# Patient Record
Sex: FEMALE | Race: WHITE | NOT HISPANIC OR LATINO | Employment: FULL TIME | URBAN - METROPOLITAN AREA
[De-identification: names, ages, dates, MRNs, and addresses within clinical notes are randomized per-mention and may not be internally consistent; named-entity substitution may affect disease eponyms.]

---

## 2023-10-26 PROBLEM — R60.0 LEG EDEMA: Status: ACTIVE | Noted: 2023-10-26

## 2023-10-26 PROBLEM — N32.81 OVERACTIVE BLADDER: Status: ACTIVE | Noted: 2023-10-26

## 2023-10-26 PROBLEM — I10 ESSENTIAL HYPERTENSION: Status: ACTIVE | Noted: 2023-10-26

## 2023-10-26 PROBLEM — R35.0 FREQUENCY OF MICTURITION: Status: RESOLVED | Noted: 2023-10-26 | Resolved: 2023-10-26

## 2023-10-26 PROBLEM — N81.4 UTEROVAGINAL PROLAPSE: Status: ACTIVE | Noted: 2023-10-26

## 2023-10-26 PROBLEM — N81.9 PROLAPSE OF FEMALE PELVIC ORGANS: Status: ACTIVE | Noted: 2023-10-26

## 2023-10-26 PROBLEM — R79.89 LOW VITAMIN D LEVEL: Status: ACTIVE | Noted: 2023-10-26

## 2023-10-26 PROBLEM — R07.9 CHEST PAIN: Status: ACTIVE | Noted: 2023-10-26

## 2023-10-26 PROBLEM — R06.02 SOB (SHORTNESS OF BREATH) ON EXERTION: Status: ACTIVE | Noted: 2023-10-26

## 2023-10-26 PROBLEM — R79.89 ELEVATED SERUM CREATININE: Status: ACTIVE | Noted: 2023-10-26

## 2023-10-26 PROBLEM — R35.0 FREQUENCY OF MICTURITION: Status: ACTIVE | Noted: 2023-10-26

## 2023-10-26 PROBLEM — R10.2 PELVIC PAIN IN FEMALE: Status: ACTIVE | Noted: 2023-10-26

## 2023-10-26 PROBLEM — E78.00 HYPERCHOLESTEROLEMIA: Status: ACTIVE | Noted: 2023-10-26

## 2023-10-26 PROBLEM — R94.31 ABNORMAL EKG: Status: ACTIVE | Noted: 2023-10-26

## 2023-10-26 RX ORDER — LEVOTHYROXINE SODIUM 88 UG/1
1 TABLET ORAL DAILY
COMMUNITY
Start: 2022-02-23

## 2023-10-26 RX ORDER — VIT C/E/ZN/COPPR/LUTEIN/ZEAXAN 250MG-90MG
1 CAPSULE ORAL DAILY
COMMUNITY
Start: 2022-07-21

## 2023-10-27 ENCOUNTER — OFFICE VISIT (OUTPATIENT)
Dept: CARDIOLOGY | Facility: CLINIC | Age: 53
End: 2023-10-27
Payer: MEDICAID

## 2023-10-27 VITALS
HEART RATE: 86 BPM | SYSTOLIC BLOOD PRESSURE: 120 MMHG | BODY MASS INDEX: 30.56 KG/M2 | HEIGHT: 64 IN | WEIGHT: 179 LBS | DIASTOLIC BLOOD PRESSURE: 80 MMHG | OXYGEN SATURATION: 96 %

## 2023-10-27 DIAGNOSIS — R94.31 ABNORMAL EKG: Primary | ICD-10-CM

## 2023-10-27 PROCEDURE — 3079F DIAST BP 80-89 MM HG: CPT | Performed by: PHYSICIAN ASSISTANT

## 2023-10-27 PROCEDURE — 3074F SYST BP LT 130 MM HG: CPT | Performed by: PHYSICIAN ASSISTANT

## 2023-10-27 PROCEDURE — 99214 OFFICE O/P EST MOD 30 MIN: CPT | Performed by: PHYSICIAN ASSISTANT

## 2023-10-27 RX ORDER — OXYCODONE AND ACETAMINOPHEN 5; 325 MG/1; MG/1
TABLET ORAL
COMMUNITY
Start: 2021-01-07

## 2023-10-27 NOTE — PROGRESS NOTES
"Chief Complaint:   Follow-up     History Of Present Illness:    Gena Baltazar is a 53 y.o. female presenting for routine cardiovascular follow up.  She reports persistent, however not worsening, dyspnea with exertion.  Patient does continue to smoke cigarettes.  Most recent cardiovascular testing including: an exercise nuclear stress test 3/2023 displaying normal myocardial perfusion with stress LVEF 69%, 2D echo at that time displaying LVEF 60% without valvulopathy.  Patient denies chest pain, chest pressure, palpitations, dyspnea on exertion, shortness of breath at rest, diaphoresis, nausea/vomiting, back pain, headache, lightheadedness, dizziness, syncope or presyncopal episodes, active bleeding signs or symptoms, excessive weight gain, muscle or joint pain, claudication.       Last Recorded Vitals:  Vitals:    10/27/23 1323   BP: 120/80   BP Location: Left arm   Patient Position: Sitting   BP Cuff Size: Adult   Pulse: 86   SpO2: 96%   Weight: 81.2 kg (179 lb)   Height: 1.626 m (5' 4\")       Past Medical History:  She has no past medical history on file.    Past Surgical History:  She has a past surgical history that includes Other surgical history (07/12/2022).      Social History:  She has no history on file for tobacco use, alcohol use, and drug use.    Family History:  No family history on file.     Allergies:  Penicillins    Outpatient Medications:  Current Outpatient Medications   Medication Instructions    cholecalciferol (Vitamin D-3) 25 MCG (1000 UT) capsule 1 capsule, oral, Daily    dulaglutide (TRULICITY) 1.5 mg, subcutaneous    levothyroxine (Synthroid, Levoxyl) 88 mcg tablet 1 tablet, oral, Daily    oxyCODONE-acetaminophen (Percocet) 5-325 mg tablet oral       Physical Exam:  Constitutional: awake and alert, oriented ×3, no apparent distress  Skin: warm, dry, good turgor no obvious lesions  Eyes: pupils equal, round, reactive to light, conjunctiva pink and noninjected, no discharge  HENT: " "normocephalic and atraumatic, mucous membranes moist, trachea midline with no masses/goiter  Cardiovascular: S1/S2 regular, no murmur no rubs/gallops, no carotid bruits, no JVD  Pulmonary: symmetrical chest expansion, lungs are clear to auscultation bilaterally, no wheezes/rales/rhonchi, normal effort  Abdomen: nontender, nondistended, active bowel sounds, no ascites  Extremities: no cyanosis, clubbing, no LE edema no lesions; palpable pedal pulses  Neurologic: cranial nerves II - XII grossly intact, stable gait, no tremor       Last Labs:  CBC -  Lab Results   Component Value Date    WBC 7.4 07/21/2022    HGB 16.4 (H) 07/21/2022    HCT 48.0 (H) 07/21/2022    MCV 87 07/21/2022     07/21/2022       CMP -  Lab Results   Component Value Date    CALCIUM 10.4 07/21/2022    PHOS 4.4 07/21/2022    ALBUMIN 4.4 07/21/2022       LIPID PANEL -   No results found for: \"CHOL\", \"TRIG\", \"HDL\", \"CHHDL\", \"LDLF\", \"VLDL\", \"NHDL\"    RENAL FUNCTION PANEL -   Lab Results   Component Value Date    GLUCOSE 93 07/21/2022     07/21/2022    K 4.4 07/21/2022     07/21/2022    CO2 29 07/21/2022    ANIONGAP 12 07/21/2022    BUN 16 07/21/2022    CREATININE 0.90 07/21/2022    CALCIUM 10.4 07/21/2022    PHOS 4.4 07/21/2022    ALBUMIN 4.4 07/21/2022        Lab Results   Component Value Date    HGBA1C 5.6 01/13/2022       Last Cardiology Tests:  ECG:  No results found for this or any previous visit from the past 1095 days.      Echo:  No results found for this or any previous visit from the past 1095 days.      Ejection Fractions:  No results found for: \"EF\"    Cath:  No results found for this or any previous visit from the past 1095 days.      Stress Test:  Nuclear Stress Test 3/28/2023      Cardiac Imaging:  No results found for this or any previous visit from the past 1095 days.            Assessment/Plan   Problem List Items Addressed This Visit             ICD-10-CM       Cardiac and Vasculature    Abnormal EKG - Primary " R94.31    Relevant Orders    CT cardiac scoring wo IV contrast     -CAC scoring    -Follow up in 6 months    Gabino Holt PA-C

## 2023-11-10 ENCOUNTER — HOSPITAL ENCOUNTER (OUTPATIENT)
Dept: RADIOLOGY | Facility: HOSPITAL | Age: 53
Discharge: HOME | End: 2023-11-10
Payer: MEDICAID

## 2023-11-10 DIAGNOSIS — R94.31 ABNORMAL EKG: ICD-10-CM

## 2023-11-10 PROCEDURE — 75571 CT HRT W/O DYE W/CA TEST: CPT

## 2024-04-15 PROBLEM — M54.30 SCIATICA: Status: ACTIVE | Noted: 2020-03-19

## 2024-04-15 PROBLEM — F17.210 CIGARETTE NICOTINE DEPENDENCE, UNCOMPLICATED: Status: ACTIVE | Noted: 2017-09-04

## 2024-04-15 PROBLEM — K57.30 DIVERTICULAR DISEASE OF COLON: Status: ACTIVE | Noted: 2020-01-09

## 2024-04-15 PROBLEM — I67.1 ANEURYSM OF INTERNAL CAROTID ARTERY (HHS-HCC): Status: ACTIVE | Noted: 2022-01-12

## 2024-04-15 PROBLEM — H91.90 HEARING LOSS: Status: ACTIVE | Noted: 2021-02-08

## 2024-05-17 ENCOUNTER — OFFICE VISIT (OUTPATIENT)
Dept: CARDIOLOGY | Facility: CLINIC | Age: 54
End: 2024-05-17
Payer: MEDICAID

## 2024-05-17 VITALS
SYSTOLIC BLOOD PRESSURE: 110 MMHG | WEIGHT: 153 LBS | OXYGEN SATURATION: 96 % | HEIGHT: 63 IN | HEART RATE: 81 BPM | DIASTOLIC BLOOD PRESSURE: 80 MMHG | BODY MASS INDEX: 27.11 KG/M2

## 2024-05-17 DIAGNOSIS — I10 ESSENTIAL HYPERTENSION: ICD-10-CM

## 2024-05-17 DIAGNOSIS — E78.00 HYPERCHOLESTEROLEMIA: ICD-10-CM

## 2024-05-17 DIAGNOSIS — R07.89 OTHER CHEST PAIN: Primary | ICD-10-CM

## 2024-05-17 PROCEDURE — 3074F SYST BP LT 130 MM HG: CPT | Performed by: PHYSICIAN ASSISTANT

## 2024-05-17 PROCEDURE — 3079F DIAST BP 80-89 MM HG: CPT | Performed by: PHYSICIAN ASSISTANT

## 2024-05-17 PROCEDURE — 99214 OFFICE O/P EST MOD 30 MIN: CPT | Performed by: PHYSICIAN ASSISTANT

## 2024-05-17 RX ORDER — TRIAMTERENE AND HYDROCHLOROTHIAZIDE 37.5; 25 MG/1; MG/1
1 CAPSULE ORAL DAILY
COMMUNITY
Start: 2024-05-03

## 2024-05-17 NOTE — PROGRESS NOTES
"Chief Complaint:   6mo/labs     History Of Present Illness:    5/17/24  CAC score resulted 0 Agatston units consistent with no identifiable arteriosclerosis.  Patient denies chest pain, chest pressure, palpitations, dyspnea on exertion, shortness of breath at rest, diaphoresis, nausea/vomiting, back pain, headache, lightheadedness, dizziness, syncope or presyncopal episodes, active bleeding signs or symptoms, excessive weight gain, muscle or joint pain, claudication.      10/27/23  Gena Baltazar is a 54 y.o. female presenting for routine cardiovascular follow up.  She reports persistent, however not worsening, dyspnea with exertion.  Patient does continue to smoke cigarettes.  Most recent cardiovascular testing including: an exercise nuclear stress test 3/2023 displaying normal myocardial perfusion with stress LVEF 69%, 2D echo at that time displaying LVEF 60% without valvulopathy.  Patient denies chest pain, chest pressure, palpitations, dyspnea on exertion, shortness of breath at rest, diaphoresis, nausea/vomiting, back pain, headache, lightheadedness, dizziness, syncope or presyncopal episodes, active bleeding signs or symptoms, excessive weight gain, muscle or joint pain, claudication.       Last Recorded Vitals:  Vitals:    05/17/24 1413   BP: 110/80   Pulse: 81   SpO2: 96%   Weight: 69.4 kg (153 lb)   Height: 1.6 m (5' 3\")       Past Medical History:  She has no past medical history on file.    Past Surgical History:  She has a past surgical history that includes Other surgical history (07/12/2022).      Social History:  She has no history on file for tobacco use, alcohol use, and drug use.    Family History:  No family history on file.     Allergies:  Penicillins    Outpatient Medications:  Current Outpatient Medications   Medication Instructions    cholecalciferol (Vitamin D-3) 25 MCG (1000 UT) capsule 1 capsule, oral, Daily    dulaglutide (TRULICITY) 1.5 mg, subcutaneous    levothyroxine (Synthroid, " "Levoxyl) 88 mcg tablet 1 tablet, oral, Daily    oxyCODONE-acetaminophen (Percocet) 5-325 mg tablet oral    triamterene-hydrochlorothiazid (Dyazide) 37.5-25 mg capsule 1 capsule, oral, Daily       Physical Exam:  Constitutional: awake and alert, oriented ×3, no apparent distress  Skin: warm, dry, good turgor no obvious lesions  Eyes: pupils equal, round, reactive to light, conjunctiva pink and noninjected, no discharge  HENT: normocephalic and atraumatic, mucous membranes moist, trachea midline with no masses/goiter  Cardiovascular: S1/S2 regular, no murmur no rubs/gallops, no carotid bruits, no JVD  Pulmonary: symmetrical chest expansion, lungs are clear to auscultation bilaterally, no wheezes/rales/rhonchi, normal effort  Abdomen: nontender, nondistended, active bowel sounds, no ascites  Extremities: no cyanosis, clubbing, no LE edema no lesions; palpable pedal pulses  Neurologic: cranial nerves II - XII grossly intact, stable gait, no tremor       Last Labs:  CBC -  Lab Results   Component Value Date    WBC 7.4 07/21/2022    HGB 16.4 (H) 07/21/2022    HCT 48.0 (H) 07/21/2022    MCV 87 07/21/2022     07/21/2022       CMP -  Lab Results   Component Value Date    CALCIUM 10.4 07/21/2022    PHOS 4.4 07/21/2022    ALBUMIN 4.4 07/21/2022       LIPID PANEL -   No results found for: \"CHOL\", \"TRIG\", \"HDL\", \"CHHDL\", \"LDLF\", \"VLDL\", \"NHDL\"    RENAL FUNCTION PANEL -   Lab Results   Component Value Date    GLUCOSE 93 07/21/2022     07/21/2022    K 4.4 07/21/2022     07/21/2022    CO2 29 07/21/2022    ANIONGAP 12 07/21/2022    BUN 16 07/21/2022    CREATININE 0.90 07/21/2022    CALCIUM 10.4 07/21/2022    PHOS 4.4 07/21/2022    ALBUMIN 4.4 07/21/2022        Lab Results   Component Value Date    HGBA1C 5.6 01/13/2022       Last Cardiology Tests:  ECG:  No results found for this or any previous visit from the past 1095 days.      Echo:  No results found for this or any previous visit from the past 1095 " "days.      Ejection Fractions:  No results found for: \"EF\"    Cath:  No results found for this or any previous visit from the past 1095 days.      Stress Test:  Nuclear Stress Test 3/28/2023      Cardiac Imaging:  No results found for this or any previous visit from the past 1095 days.      Assessment/Plan   Problem List Items Addressed This Visit             ICD-10-CM       Cardiac and Vasculature    Chest pain - Primary R07.9    Essential hypertension I10    Hypercholesterolemia E78.00       -CAC scoring = 0 Agatston units    -Follow up with Dr. Felder as scheduled    Gabino Holt PA-C  "

## 2024-06-20 ENCOUNTER — APPOINTMENT (OUTPATIENT)
Dept: NEPHROLOGY | Facility: CLINIC | Age: 54
End: 2024-06-20
Payer: MEDICAID

## 2024-06-20 ENCOUNTER — HOSPITAL ENCOUNTER (OUTPATIENT)
Dept: RADIOLOGY | Facility: HOSPITAL | Age: 54
Discharge: HOME | End: 2024-06-20
Payer: MEDICAID

## 2024-06-20 ENCOUNTER — LAB (OUTPATIENT)
Dept: LAB | Facility: HOSPITAL | Age: 54
End: 2024-06-20
Payer: MEDICAID

## 2024-06-20 VITALS
BODY MASS INDEX: 27.11 KG/M2 | HEART RATE: 70 BPM | SYSTOLIC BLOOD PRESSURE: 122 MMHG | WEIGHT: 153 LBS | HEIGHT: 63 IN | DIASTOLIC BLOOD PRESSURE: 78 MMHG

## 2024-06-20 DIAGNOSIS — I10 ESSENTIAL HYPERTENSION: Primary | ICD-10-CM

## 2024-06-20 DIAGNOSIS — I10 ESSENTIAL HYPERTENSION: ICD-10-CM

## 2024-06-20 DIAGNOSIS — N02.8 RECURRENT AND PERSISTENT HEMATURIA WITH OTHER MORPHOLOGIC CHANGES: ICD-10-CM

## 2024-06-20 DIAGNOSIS — M54.9 COSTOVERTEBRAL ANGLE TENDERNESS: ICD-10-CM

## 2024-06-20 DIAGNOSIS — K21.00 GASTROESOPHAGEAL REFLUX DISEASE WITH ESOPHAGITIS WITHOUT HEMORRHAGE: ICD-10-CM

## 2024-06-20 LAB
ALBUMIN SERPL BCP-MCNC: 4.3 G/DL (ref 3.4–5)
ANION GAP SERPL CALC-SCNC: 17 MMOL/L (ref 10–20)
APPEARANCE UR: CLEAR
BILIRUB UR STRIP.AUTO-MCNC: NEGATIVE MG/DL
BUN SERPL-MCNC: 14 MG/DL (ref 6–23)
CALCIUM SERPL-MCNC: 10 MG/DL (ref 8.6–10.6)
CHLORIDE SERPL-SCNC: 104 MMOL/L (ref 98–107)
CO2 SERPL-SCNC: 25 MMOL/L (ref 21–32)
COLOR UR: COLORLESS
CREAT SERPL-MCNC: 0.86 MG/DL (ref 0.5–1.05)
EGFRCR SERPLBLD CKD-EPI 2021: 79 ML/MIN/1.73M*2
ERYTHROCYTE [DISTWIDTH] IN BLOOD BY AUTOMATED COUNT: 12.6 % (ref 11.5–14.5)
GLUCOSE SERPL-MCNC: 83 MG/DL (ref 74–99)
GLUCOSE UR STRIP.AUTO-MCNC: NORMAL MG/DL
HCT VFR BLD AUTO: 43.3 % (ref 36–46)
HGB BLD-MCNC: 15 G/DL (ref 12–16)
KETONES UR STRIP.AUTO-MCNC: NEGATIVE MG/DL
LEUKOCYTE ESTERASE UR QL STRIP.AUTO: NEGATIVE
MCH RBC QN AUTO: 29.6 PG (ref 26–34)
MCHC RBC AUTO-ENTMCNC: 34.6 G/DL (ref 32–36)
MCV RBC AUTO: 85 FL (ref 80–100)
NITRITE UR QL STRIP.AUTO: NEGATIVE
NRBC BLD-RTO: 0 /100 WBCS (ref 0–0)
PH UR STRIP.AUTO: 7.5 [PH]
PHOSPHATE SERPL-MCNC: 4 MG/DL (ref 2.5–4.9)
PLATELET # BLD AUTO: 220 X10*3/UL (ref 150–450)
POTASSIUM SERPL-SCNC: 3.7 MMOL/L (ref 3.5–5.3)
PROT UR STRIP.AUTO-MCNC: NEGATIVE MG/DL
RBC # BLD AUTO: 5.07 X10*6/UL (ref 4–5.2)
RBC # UR STRIP.AUTO: ABNORMAL /UL
RBC #/AREA URNS AUTO: NORMAL /HPF
SODIUM SERPL-SCNC: 142 MMOL/L (ref 136–145)
SP GR UR STRIP.AUTO: 1.01
SQUAMOUS #/AREA URNS AUTO: NORMAL /HPF
UROBILINOGEN UR STRIP.AUTO-MCNC: NORMAL MG/DL
WBC # BLD AUTO: 6.6 X10*3/UL (ref 4.4–11.3)
WBC #/AREA URNS AUTO: NORMAL /HPF

## 2024-06-20 PROCEDURE — 99214 OFFICE O/P EST MOD 30 MIN: CPT | Performed by: INTERNAL MEDICINE

## 2024-06-20 PROCEDURE — 87086 URINE CULTURE/COLONY COUNT: CPT

## 2024-06-20 PROCEDURE — 81003 URINALYSIS AUTO W/O SCOPE: CPT

## 2024-06-20 PROCEDURE — 36415 COLL VENOUS BLD VENIPUNCTURE: CPT

## 2024-06-20 PROCEDURE — 80069 RENAL FUNCTION PANEL: CPT

## 2024-06-20 PROCEDURE — 74176 CT ABD & PELVIS W/O CONTRAST: CPT

## 2024-06-20 PROCEDURE — 85027 COMPLETE CBC AUTOMATED: CPT

## 2024-06-20 PROCEDURE — 3078F DIAST BP <80 MM HG: CPT | Performed by: INTERNAL MEDICINE

## 2024-06-20 PROCEDURE — 74176 CT ABD & PELVIS W/O CONTRAST: CPT | Performed by: RADIOLOGY

## 2024-06-20 PROCEDURE — 3074F SYST BP LT 130 MM HG: CPT | Performed by: INTERNAL MEDICINE

## 2024-06-20 RX ORDER — TURMERIC 400 MG
1 CAPSULE ORAL DAILY
COMMUNITY

## 2024-06-20 RX ORDER — ERYTHROMYCIN 250 MG/1
250 TABLET, DELAYED RELEASE ORAL DAILY
COMMUNITY

## 2024-06-20 RX ORDER — VITAMIN E MIXED 400 UNIT
400 CAPSULE ORAL DAILY
COMMUNITY

## 2024-06-20 ASSESSMENT — PAIN SCALES - GENERAL: PAINLEVEL: 10-WORST PAIN EVER

## 2024-06-20 NOTE — PROGRESS NOTES
"Chief Complaint   Patient presents with    Hypertension     Follow up visit.  1st OR=744/79, 71; 2nd IW=098/77, 70; 3rd BC=276/79, 70   Follow-up for HTN, mild CKD 2, hematuria     HPI  Subjective: Gena Baltazar is a 54 y.o. female with mild CKD 2, without proteinuria, microscopic hematuria, who used to take NSAIDs regularly for HA x 10 years who is here for follow-up as above. Has incidentaly discovered cerebral aneurism followed at [facility]   At the time of this visit she complains of pain on left flank and epigastric pain that gets worse with eating     Is currently working with urology for evaluation of lower  tract as reason for microscopic hematuria   Denies dysuria, frequency or urgency      PMH:  Hypothyroid   HTN   Neck pain / Shoulder pain  migraine HA almost every day; Tylenol ; Aleve x 2 a day --> > 10 years 4 \"aspirins\" s a day Tylenol or Aleve a day      Review of labs from [facility] :      2018: crt 1.07 BUN 14 ; K 3.6 eGFR 54 --> crt 1.06 --> 2022 crt 0.9   k/l ratio 1.22   Brain aneurism discovered after a fall in Jan 2022   2 pregnancies - no preeclampsia     HTN diagnosed at age 47   HA     Home BP: similar with office, but no recent readings    ROS: all reviewed and negative except as above.     Active Ambulatory Problems     Diagnosis Date Noted    Abnormal EKG 10/26/2023    Chest pain 10/26/2023    Elevated serum creatinine 10/26/2023    Essential hypertension 10/26/2023    Hypercholesterolemia 10/26/2023    Leg edema 10/26/2023    Low vitamin D level 10/26/2023    Overactive bladder 10/26/2023    Pelvic pain in female 10/26/2023    Prolapse of female pelvic organs 10/26/2023    SOB (shortness of breath) on exertion 10/26/2023    Uterovaginal prolapse 10/26/2023    Aneurysm of internal carotid artery ([facility]) 01/12/2022    Cigarette nicotine dependence, uncomplicated 09/04/2017    Hearing loss 02/08/2021    Hypothyroidism 03/04/2010    Diverticular disease of colon 01/09/2020    Sciatica 03/19/2020 " "    Resolved Ambulatory Problems     Diagnosis Date Noted    Frequency of micturition 10/26/2023     No Additional Past Medical History       Current Outpatient Medications on File Prior to Visit   Medication Sig Dispense Refill    cholecalciferol (Vitamin D-3) 25 MCG (1000 UT) capsule Take 1 capsule (25 mcg) by mouth once daily.      dulaglutide (Trulicity) 0.75 mg/0.5 mL pen injector Inject 1.5 mg under the skin.      erythromycin (Hemant-Tab) 250 mg EC tablet Take 1 tablet (250 mg) by mouth once daily. Do not crush, chew, or split.      levothyroxine (Synthroid, Levoxyl) 88 mcg tablet Take 1 tablet (88 mcg) by mouth once daily.      oxyCODONE-acetaminophen (Percocet) 5-325 mg tablet Take by mouth.      triamterene-hydrochlorothiazid (Dyazide) 37.5-25 mg capsule Take 1 capsule by mouth once daily.      turmeric 400 mg capsule Take 1 capsule by mouth once daily.      vitamin E 180 mg (400 unit) capsule Take 1 capsule (400 Units) by mouth once daily.       No current facility-administered medications on file prior to visit.       Allergies   Allergen Reactions    Penicillins Unknown    Doxycycline Rash    Nalbuphine Drowsiness and Other     Other reaction(s): patient states she slept 2days       /78 (BP Location: Right arm, Patient Position: Sitting, BP Cuff Size: Adult)   Pulse 70   Ht 1.6 m (5' 3\")   Wt 69.4 kg (153 lb)   BMI 27.10 kg/m²   Constitutional: no acute distress, well appearing and well nourished.   Psychiatric: orientation to person, place, and time. Appropriate mood and affect.   Neurologic: no gross motor deficit and asterixis is not present.   Neck: no thyromegaly, jugular venous distension or audible carotid bruits.   Cardiovascular: auscultation of heart: normal rate and rhythm, normal S1 and S2, no murmurs. There was no pericardial friction rub.   Pulmonary: equal chest expansion, lungs are clear to auscultation.   Abdomen: soft, non tender to palpation, normoactive bowel sounds. No " organomegaly.  Kidneys: bilateral lower poles not palpable and + tender with percussion on right .   Extremities: exam of extremities was normal. No clubbing of the fingernails and no cyanosis and no edema present.   Skin: warm, pink, well conditioned; no rashes or lesions. No livedo reticularis, hair and nails normal and skin and subcutaneous tissue normal.   Lymphatic: no cervical lymphadenopathy.      Lab Results   Component Value Date    WBC 7.4 07/21/2022    HGB 16.4 (H) 07/21/2022    HCT 48.0 (H) 07/21/2022    MCV 87 07/21/2022     07/21/2022       Lab Results   Component Value Date    GLUCOSE 93 07/21/2022    CALCIUM 10.4 07/21/2022     07/21/2022    K 4.4 07/21/2022    CO2 29 07/21/2022     07/21/2022    BUN 16 07/21/2022    CREATININE 0.90 07/21/2022       Lab Results   Component Value Date    PTH 30.7 07/21/2022    CALCIUM 10.4 07/21/2022    PHOS 4.4 07/21/2022       Lab Results   Component Value Date    ALBUMIN 4.4 07/21/2022       Assessment/Plan      CKD stage G2 /A0 (eGFR 76 ml/min, crt 0.9 mg/dl, TP/C <4 mg/g) with history of microscopic hematuria and no proteinuria, RBC <5.  Has an upcoming appointment with Urology for cystoscopy to r/o lower /malignancy related causes. Glomerular causes of isolated microscopic hematuria can be a limited number of conditions including IgA N, thin membrane disease , Alports, and MPGN. She has no family hx of Alport's so that is low yield. Prior serology tests unrevealing. Plan to check RFP and quantify proteinuria today ; She would benefit from ACEi/ARB to slow CKD progression - but she is not interested at this time. Likewise she is not interested in pursuing a kidney biopsy at this time. This is fair given stability of kidney function and lack of proteinuria      BP/Volume: seems euvolemic on exam and BP borderline (primarily she has diastolic HTN) Plan to continue current regimen consisting of: Triamterene/HCTZ ; ACEI/ARB to be added at next  visit if pt willing for long term kidney protection. Lack of proteinuria is a good prognostic indicator. Continue Low salt diet and regular exercise.      CKD-MBD: normal calcium, phosphorus, PTH, vit D and bicarbonate levels.      No anemia     Flank pain with + CVA : ordered CT abd/pelvis to r/o pyelo/ nephrolithiasis and she will continue follow-up with Urology for hematuria     Epigastric pain with eating - referred to GI for upper endoscopy     Return to clinic in 5 months.    Miladys Benavidez MD MPH

## 2024-06-21 LAB — BACTERIA UR CULT: NORMAL

## 2024-06-26 ENCOUNTER — APPOINTMENT (OUTPATIENT)
Dept: UROLOGY | Facility: CLINIC | Age: 54
End: 2024-06-26
Payer: MEDICAID

## 2024-06-26 VITALS — SYSTOLIC BLOOD PRESSURE: 123 MMHG | DIASTOLIC BLOOD PRESSURE: 82 MMHG | HEART RATE: 84 BPM

## 2024-06-26 DIAGNOSIS — Z00.00 ANNUAL PHYSICAL EXAM: Primary | ICD-10-CM

## 2024-06-26 DIAGNOSIS — R39.15 URGENCY OF URINATION: ICD-10-CM

## 2024-06-26 DIAGNOSIS — R31.21 ASYMPTOMATIC MICROSCOPIC HEMATURIA: ICD-10-CM

## 2024-06-26 DIAGNOSIS — R35.0 URINARY FREQUENCY: Primary | ICD-10-CM

## 2024-06-26 DIAGNOSIS — N39.41 URGENCY INCONTINENCE: ICD-10-CM

## 2024-06-26 LAB
POC APPEARANCE, URINE: CLEAR
POC BILIRUBIN, URINE: NEGATIVE
POC BLOOD, URINE: ABNORMAL
POC COLOR, URINE: YELLOW
POC GLUCOSE, URINE: NEGATIVE MG/DL
POC KETONES, URINE: NEGATIVE MG/DL
POC LEUKOCYTES, URINE: NEGATIVE
POC NITRITE,URINE: NEGATIVE
POC PH, URINE: 5.5 PH
POC PROTEIN, URINE: NEGATIVE MG/DL
POC SPECIFIC GRAVITY, URINE: 1.01
POC UROBILINOGEN, URINE: 0.2 EU/DL

## 2024-06-26 PROCEDURE — 51798 US URINE CAPACITY MEASURE: CPT | Performed by: NURSE PRACTITIONER

## 2024-06-26 PROCEDURE — 3074F SYST BP LT 130 MM HG: CPT | Performed by: NURSE PRACTITIONER

## 2024-06-26 PROCEDURE — 3079F DIAST BP 80-89 MM HG: CPT | Performed by: NURSE PRACTITIONER

## 2024-06-26 PROCEDURE — 81002 URINALYSIS NONAUTO W/O SCOPE: CPT | Performed by: NURSE PRACTITIONER

## 2024-06-26 PROCEDURE — 99214 OFFICE O/P EST MOD 30 MIN: CPT | Performed by: NURSE PRACTITIONER

## 2024-06-26 NOTE — PROGRESS NOTES
06/26/24   [id number]    Chief Complaint   Patient presents with    Follow-up     Urinary frequency      Subjective      HPI Gena Baltazar is a 54 y.o. female who presents for urinary frequency, urgency, urge incontinence;     Saw me and Dr. Choi in 2022; considered procedure for small prolapse at that time;     Urine microscopy rbc 3-5/hpf, negative  Aware of microscopic hematuria  Has not had cystoscopy    Changes clothing due to leakage multiple x per day  NTF 2 x  DTF 10 x    Last menses at age 27 ablation then    CT abd/pel wo IV contrast: CVA tenderness, r/o pyelonephritis vs nephrolithiasis: KIDNEYS AND URETERS:  No hydronephrosis or renal masses. No perinephric stranding or fluid collection. No radiodense renal calculi.  PELVIS:  BLADDER:  Within normal limits as distended. No bladder calculi or masses.   REPRODUCTIVE ORGANS: no pelvic free fluid, masses or lymphadenopathy. The uterus is present.      Smoked > 20 yrs < 1 ppd  Cleans for living w chemicals  No urological cancers in her family specifically known, maybe as all aunts/uncles have had cancer     Objective     /82   Pulse 84    Physical Exam  General: Appears comfortable and in no apparent distress, well nourished  Head: Normocephalic, atraumatic  Neck: trachea midline  Respiratory: respirations unlabored, no wheezes, and no use of accessory muscles  Cardiovascular: at rest no dyspnea, well perfused  Skin: no visible rashes or lesions  Neurologic: grossly intact, oriented to person, place, and time  Psychiatric: mood and affect appropriate  Musculoskeletal: in chair for appt. no difficulty w upper body movement    Assessment/Plan   Problem List Items Addressed This Visit    None  Visit Diagnoses       Urinary frequency    -  Primary    Relevant Medications    vibegron 75 mg tablet    vibegron 75 mg tablet    Other Relevant Orders    POCT UA (nonautomated) manually resulted (Completed)    Post-Void Residual (Completed)    Urine culture     Urgency incontinence        Relevant Medications    vibegron 75 mg tablet    Other Relevant Orders    Urine culture    Urgency of urination        Relevant Medications    vibegron 75 mg tablet    Other Relevant Orders    Urine culture    Asymptomatic microscopic hematuria              Orders Placed This Encounter   Procedures    Post-Void Residual    Urine culture     Standing Status:   Future     Standing Expiration Date:   7/3/2024     Order Specific Question:   Release result to MyChart     Answer:   Immediate [1]    POCT UA (nonautomated) manually resulted     Order Specific Question:   Release result to MyChart     Answer:   Immediate [1]      Microscopic hematuria  Also recent microscopic hematuria,  imaging neg, will arrange cystoscopy   To complete evaluation of hematuria    Overactive bladder, hx small prolapse  Trial Gemtesa  Appt. W Dr. Choi cystoscopy  Pelvic exam w cysto  consider surgery, has discussed w him in past    Nurse line [phone]        Cystoscopy w Dr. Choi, discuss surgery pelvic exam (requested cysto and FUV slot to have time to discuss plan and do pelvic)       Stephanie Smith, APRN-CNP  Lab Results   Component Value Date    GLUCOSE 83 06/20/2024    CALCIUM 10.0 06/20/2024     06/20/2024    K 3.7 06/20/2024    CO2 25 06/20/2024     06/20/2024    BUN 14 06/20/2024    CREATININE 0.86 06/20/2024

## 2024-06-26 NOTE — Clinical Note
Coming for cysto and discuss possible surgery, you saw her couple years ago about prolapse surgery but she didn't go thru with then, cysto is for microscopic hematuria. Thanks, Stephanie

## 2024-06-26 NOTE — PATIENT INSTRUCTIONS
Microscopic hematuria  Also recent microscopic hematuria,  imaging neg, will arrange cystoscopy   To complete evaluation of hematuria    Overactive bladder, hx small prolapse  Trial Gemtesa  Appt. W Dr. Choi cystoscopy  Pelvic exam w cysto  consider surgery, has discussed w him in past    Nurse line [phone]

## 2024-06-28 ENCOUNTER — TELEPHONE (OUTPATIENT)
Dept: UROLOGY | Facility: CLINIC | Age: 54
End: 2024-06-28
Payer: MEDICAID

## 2024-06-28 DIAGNOSIS — N39.41 URGENCY INCONTINENCE: Primary | ICD-10-CM

## 2024-06-28 RX ORDER — SOLIFENACIN SUCCINATE 5 MG/1
5 TABLET, FILM COATED ORAL DAILY
Qty: 30 TABLET | Refills: 11 | Status: SHIPPED | OUTPATIENT
Start: 2024-06-28 | End: 2025-06-28

## 2024-06-28 NOTE — TELEPHONE ENCOUNTER
Patient's insurance needs a PA for Gemtesa. I do not see in her notes that she has tried other OAB meds first. Please advise and I will either decline or move forward with PA, thanks.

## 2024-07-22 ENCOUNTER — LAB (OUTPATIENT)
Dept: LAB | Facility: LAB | Age: 54
End: 2024-07-22
Payer: MEDICAID

## 2024-07-22 ENCOUNTER — APPOINTMENT (OUTPATIENT)
Dept: OBSTETRICS AND GYNECOLOGY | Facility: CLINIC | Age: 54
End: 2024-07-22
Payer: MEDICAID

## 2024-07-22 VITALS
BODY MASS INDEX: 27.64 KG/M2 | SYSTOLIC BLOOD PRESSURE: 118 MMHG | HEIGHT: 63 IN | DIASTOLIC BLOOD PRESSURE: 72 MMHG | WEIGHT: 156 LBS

## 2024-07-22 DIAGNOSIS — Z12.4 CERVICAL CANCER SCREENING: ICD-10-CM

## 2024-07-22 DIAGNOSIS — N95.1 HOT FLASHES DUE TO MENOPAUSE: Primary | ICD-10-CM

## 2024-07-22 DIAGNOSIS — Z00.00 ANNUAL PHYSICAL EXAM: ICD-10-CM

## 2024-07-22 DIAGNOSIS — N95.1 HOT FLASHES DUE TO MENOPAUSE: ICD-10-CM

## 2024-07-22 PROCEDURE — 88175 CYTOPATH C/V AUTO FLUID REDO: CPT

## 2024-07-22 PROCEDURE — 3074F SYST BP LT 130 MM HG: CPT | Performed by: STUDENT IN AN ORGANIZED HEALTH CARE EDUCATION/TRAINING PROGRAM

## 2024-07-22 PROCEDURE — 3078F DIAST BP <80 MM HG: CPT | Performed by: STUDENT IN AN ORGANIZED HEALTH CARE EDUCATION/TRAINING PROGRAM

## 2024-07-22 PROCEDURE — 36415 COLL VENOUS BLD VENIPUNCTURE: CPT

## 2024-07-22 PROCEDURE — 87624 HPV HI-RISK TYP POOLED RSLT: CPT

## 2024-07-22 PROCEDURE — 83001 ASSAY OF GONADOTROPIN (FSH): CPT

## 2024-07-22 PROCEDURE — 99386 PREV VISIT NEW AGE 40-64: CPT | Performed by: STUDENT IN AN ORGANIZED HEALTH CARE EDUCATION/TRAINING PROGRAM

## 2024-07-22 PROCEDURE — 82670 ASSAY OF TOTAL ESTRADIOL: CPT

## 2024-07-22 PROCEDURE — 3008F BODY MASS INDEX DOCD: CPT | Performed by: STUDENT IN AN ORGANIZED HEALTH CARE EDUCATION/TRAINING PROGRAM

## 2024-07-22 NOTE — PROGRESS NOTES
"Gena Baltazar is a 54 y.o.  female who is here for a routine exam.   PCP = Phyllis Thrasher MD    Subjective     Concerns today:     Reports some menopausal symptoms - gets hot flashes with aggravation    Ablation - age 27, no bleeding since then.    Gyn History:  Menarche: age 9 / ablation age 27  Sexual activity: intermittently sexually active  Current contraception: s/p BTL  STI History: none  Pap History: Last unsure, unsure of abnormal    Preventative:  Family Hx Breast/Ovarian Ca: Mother - Small cell lung cancer.   Mammogram: Goes annually - [facility]   Family Hx Colon Ca: none  Last Colonoscopy: less than 4 years ago  DEXA: age 63  HPV vaccination: never received  Exercise: active lifestyle  Diet: no particular  Seat Belt Use: always    Social History:  Living Situation: Lives with    School/Employment: Works cleaning houses  Substance:    Tob: Cigarettes - 0.5 ppd   EtOH: None   Other Substances: None  Safe at Home?: Yes  Depression Screen/Mood concerns: No         Objective   /72   Ht 1.6 m (5' 3\")   Wt 70.8 kg (156 lb)   BMI 27.63 kg/m²   Physical Exam  Vitals reviewed. Exam conducted with a chaperone present.   Constitutional:       Appearance: Normal appearance.   HENT:      Head: Normocephalic.   Cardiovascular:      Rate and Rhythm: Normal rate and regular rhythm.   Pulmonary:      Effort: Pulmonary effort is normal. No respiratory distress.   Chest:   Breasts:     Right: Normal. No swelling, mass or skin change.      Left: Normal. No swelling, mass or skin change.   Abdominal:      General: There is no distension.      Palpations: Abdomen is soft. There is no mass.      Tenderness: There is no abdominal tenderness. There is no guarding or rebound.   Genitourinary:     Comments: Normal appearing external female genitalia. Vaginal mucosa normal appearing without lesions. Cervix without lesions.  Lymphadenopathy:      Upper Body:      Right upper body: No supraclavicular, axillary " or pectoral adenopathy.      Left upper body: No supraclavicular, axillary or pectoral adenopathy.   Skin:     General: Skin is warm and dry.   Neurological:      General: No focal deficit present.      Mental Status: She is alert.   Psychiatric:         Mood and Affect: Mood normal.         Behavior: Behavior normal.         Thought Content: Thought content normal.         Judgment: Judgment normal.             Assessment/Plan      Gena Baltazar is a 54 y.o.  female presenting today for annual exam with the following problems addressed:    Problem List Items Addressed This Visit       Annual physical exam     - Reviewed healthy lifestyle including well rounded diet and exercise (moderate intensity 150min/week; high intensity 75min/week)  - Immunizations: UTD  - Pap collected, will follow up results with patient via myChart or mail  - Mammo UTD at [facility] per patient  - [place] UTD per patient  - Precontemplative stage for tobacco cessation         Hot flashes due to menopause - Primary     Uninterested in hormonal or prescription options  Reviewed black cohosh as having demonstrated beneficial effects - can consider if she desires naturally derived approach  Offered and accepted E2, FSH levels given history of ablation with no bleeding since age 27, indeterminate symptoms         Relevant Orders    Follicle Stimulating Hormone    Estradiol     Other Visit Diagnoses       Cervical cancer screening        Relevant Orders    THINPREP PAP TEST

## 2024-07-22 NOTE — ASSESSMENT & PLAN NOTE
Uninterested in hormonal or prescription options  Reviewed black cohosh as having demonstrated beneficial effects - can consider if she desires naturally derived approach  Offered and accepted E2, FSH levels given history of ablation with no bleeding since age 27, indeterminate symptoms

## 2024-07-22 NOTE — ASSESSMENT & PLAN NOTE
- Reviewed healthy lifestyle including well rounded diet and exercise (moderate intensity 150min/week; high intensity 75min/week)  - Immunizations: UTD  - Pap collected, will follow up results with patient via myChart or mail  - Mammo UTD at [facility] per patient  - [place] UTD per patient  - Precontemplative stage for tobacco cessation

## 2024-07-23 LAB
ESTRADIOL SERPL-MCNC: 29 PG/ML
FSH SERPL-ACNC: 82.9 IU/L

## 2024-08-02 LAB
CYTOLOGY CMNT CVX/VAG CYTO-IMP: NORMAL
HPV HR 12 DNA GENITAL QL NAA+PROBE: NEGATIVE
HPV HR GENOTYPES PNL CVX NAA+PROBE: NEGATIVE
HPV16 DNA SPEC QL NAA+PROBE: NEGATIVE
HPV18 DNA SPEC QL NAA+PROBE: NEGATIVE
LAB AP HPV GENOTYPE QUESTION: YES
LAB AP HPV HR: NORMAL
LABORATORY COMMENT REPORT: NORMAL
PATH REPORT.TOTAL CANCER: NORMAL

## 2024-08-08 ENCOUNTER — OFFICE VISIT (OUTPATIENT)
Dept: GASTROENTEROLOGY | Facility: CLINIC | Age: 54
End: 2024-08-08
Payer: MEDICAID

## 2024-08-08 DIAGNOSIS — K58.2 IRRITABLE BOWEL SYNDROME WITH BOTH CONSTIPATION AND DIARRHEA: ICD-10-CM

## 2024-08-08 DIAGNOSIS — K21.00 GASTROESOPHAGEAL REFLUX DISEASE WITH ESOPHAGITIS WITHOUT HEMORRHAGE: Primary | ICD-10-CM

## 2024-08-08 PROCEDURE — 99204 OFFICE O/P NEW MOD 45 MIN: CPT | Performed by: NURSE PRACTITIONER

## 2024-08-08 RX ORDER — WHEAT DEXTRIN 5 G/7.4 G
POWDER (GRAM) ORAL
Qty: 248 G | Refills: 1 | Status: SHIPPED | OUTPATIENT
Start: 2024-08-08

## 2024-08-09 ENCOUNTER — TELEPHONE (OUTPATIENT)
Dept: GASTROENTEROLOGY | Facility: HOSPITAL | Age: 54
End: 2024-08-09
Payer: MEDICAID

## 2024-08-09 ASSESSMENT — ENCOUNTER SYMPTOMS
DIAPHORESIS: 0
ROS GI COMMENTS: SEE HPI
NEUROLOGICAL NEGATIVE: 1
HEMATOLOGIC/LYMPHATIC NEGATIVE: 1
SHORTNESS OF BREATH: 0
FEVER: 0
COUGH: 0
STRIDOR: 0
CHEST TIGHTNESS: 0
APNEA: 0
CARDIOVASCULAR NEGATIVE: 1
RESPIRATORY NEGATIVE: 1
FATIGUE: 0
EYES NEGATIVE: 1
ALLERGIC/IMMUNOLOGIC NEGATIVE: 1
ENDOCRINE NEGATIVE: 1
MUSCULOSKELETAL NEGATIVE: 1
PSYCHIATRIC NEGATIVE: 1
CHILLS: 0
DIFFICULTY URINATING: 0
WHEEZING: 0

## 2024-08-09 NOTE — PROGRESS NOTES
Subjective   Patient ID: Gena Baltazar is a 54 y.o. female who presents for No chief complaint on file..  PMH:HTN,hearing loss, hypothyroidism, obstructive lung disease, aneurysm  Denies a history of blood thinners, diabetes, hear issues, pacer/defib    Requests appointment for chest discomfort-was seen by cardiologist and ruled out cardiac issues  Ongoing for 1 year  She denies heartburn or vomiting  She has epigastric abdomina pain and bloating after eating  She has never had an EGD  She denies dark tarry stools rectal bleed, dysphagia, odynophagia  She does not eat a lot    She was on trucility but sopped   Sister and nephew had cancer-unsure   Recent colonoscopy with Dr. Petit -3 years ago, unsure of results      Review of Systems   Constitutional:  Negative for chills, diaphoresis, fatigue and fever.   HENT: Negative.     Eyes: Negative.    Respiratory: Negative.  Negative for apnea, cough, chest tightness, shortness of breath, wheezing and stridor.    Cardiovascular: Negative.    Gastrointestinal:         See HPI    Endocrine: Negative.    Genitourinary: Negative.  Negative for difficulty urinating.   Musculoskeletal: Negative.    Skin: Negative.    Allergic/Immunologic: Negative.    Neurological: Negative.    Hematological: Negative.    Psychiatric/Behavioral: Negative.         Objective   Physical Exam  Neurological:      Mental Status: She is alert.         Assessment/Plan   Diagnoses and all orders for this visit:  Gastroesophageal reflux disease with esophagitis without hemorrhage  -     Referral to Gastroenterology  -     Esophagogastroduodenoscopy (EGD); Future  Irritable bowel syndrome with both constipation and diarrhea  -     wheat dextrin (Benefiber Healthy Shape) 5 gram/7.4 gram powder; Take 1 teaspoon daily     54 year old female with a PMH of HTN,hearing loss, hypothyroidism, obstructive lung disease, aneurysm requests telephone visit today for epigastric abdominal pain, bloating after eating.  Ongoing for 1 year. She also reports atypical chest pain with negative cardiac work-up. No prior EGD. She did have a prior colonoscopy about 3 years ago however I am unable to see results.    She does report a h/o IBS with diarrhea only in the mooring.     Obtain OSH colonoscopy- [place]  Start fiber for intermittent diarrhea  Complete EGD to r/o stricture, narrowing  Start Omeprazole  Reviewed taking small bites, sips between bites  Reviewed s/sx to go to the ED  F/up after EGD     *Note and appointment completed with downtime procedures and limited access to EMR*    Leanna Duggan, ANTON-CNP 08/09/24 11:43 AM

## 2024-08-09 NOTE — TELEPHONE ENCOUNTER
Yesterday when you called her it came up as spa. She is not able to answer [facility] calls  [phone]

## 2024-08-20 NOTE — PROGRESS NOTES
HISTORY OF PRESENT ILLNESS:  This is a 54 y.o. female,  who presents with asymptomatic microscopic hematuria for a cystoscopy. She was referred by Stephanie Smith CNP.     A CT scan was ordered and performed. Below is the transcription:    Interpreted By:  Diamante Butler,   STUDY:  CT ABDOMEN PELVIS WO IV CONTRAST;  2024 5:33 pm      INDICATION:  Signs/Symptoms:CVA tenderness, r/o pyelonephritis  vs nephrolithiasis.      COMPARISON:  None.      ACCESSION NUMBER(S):  [id number]      ORDERING CLINICIAN:  SHANE SNOW      TECHNIQUE:  CT of the abdomen and pelvis was performed.  Standard contiguous  axial images were obtained at 3 mm slice thickness through the  abdomen and pelvis. Coronal and sagittal reconstructions at 3 mm  slice thickness were performed.      FINDINGS:  Limited study without IV contrast.      LOWER CHEST:  The visualized bases are clear bilaterally.      ABDOMEN:      LIVER:  Multiple small low-attenuation lesions throughout the liver  suggestive of cysts or hemangiomas but incompletely characterized due  to small size and lack of IV contrast. The largest on segment 4  masses approximately 12 mm in size. Liver is normal in size.      BILE DUCTS:  Biliary system is nondilated.      GALLBLADDER:  The gallbladder is incompletely distended. No radiodense calculi.      PANCREAS:  No focal lesions. No peripancreatic fat stranding.      SPLEEN:  The spleen is normal in size. No focal abnormalities are seen.      ADRENAL GLANDS:  The adrenal glands bilaterally within normal limits.      KIDNEYS AND URETERS:  No hydronephrosis or renal masses. No perinephric stranding or fluid  collection. No radiodense renal calculi.      PELVIS:      BLADDER:  Within normal limits as distended. No bladder calculi or masses.      REPRODUCTIVE ORGANS:  No pelvic free fluid, masses or lymphadenopathy. The uterus is  present.      BOWEL:  The small and large bowel are nondilated.  The appendix within normal  limits.  No mesenteric edema or lymphadenopathy. Few isolated colonic  diverticula but no CT evidence for acute diverticulitis.      VESSELS:  Aorta and IVC within normal limits as visualized in this exam. No  signs of aortic aneurysm.      PERITONEUM/RETROPERITONEUM/LYMPH NODES:  No retroperitoneal masses are seen.  No lymphadenopathy.      BONES AND ABDOMINAL WALL:  There is no evidence for destructive lytic or blastic bone lesions  identified.  No abdominal wall masses or hernias are identified.      IMPRESSION:  1.  Within limitation of this unenhanced exam, no renal abnormalities  are seen bilaterally. No hydronephrosis, hydroureter or urinary tract  stones. No perinephric stranding or renal masses.  2. Minimal colonic diverticulosis but no CT evidence for acute  diverticulitis.  3. Few small low-attenuation lesions in the liver suggestive of small  cysts or hemangiomas but incompletely characterized due to small size  and lack of IV contrast.  4. Additional detailed nonacute findings as above  PHYSICAL EXAMINATION:  No LMP recorded. Patient has had an ablation.  There is no height or weight on file to calculate BMI.  Visit Vitals  OB Status Ablation   Smoking Status Every Day     General Appearance: well appearing  Neuro: Alert and oriented     PROCEDURE NOTE:  Patient ID: Gena Baltazar is a 54 y.o. female.    Cystoscopy    Date/Time: 8/22/2024 2:16 PM    Performed by: Emmanuel Choi MD  Authorized by: Emmanuel Choi MD        PREOPERATIVE DIAGNOSIS:  Asymptomatic hematuria    POSTOPERATIVE DIAGNOSIS:  Same    OPERATION:  Flexible Cystourethroscopy      SURGEON: Dr. Emmanuel Choi    ANESTHESIA:  2%  lidocaine jelly    COMPLICATIONS:  None    EBL: Minimal    SPECIMEN:  Voided urine was collected and submitted for cytology.    DISPOSITION:  The patient was discharged home after the procedure, per routine.    INDICATIONS: :  Ms. Baltazar is a 54 y.o. patient with a history of microhematuria who presents today for  Cystoscopy.     The indications, risks and benefits of this procedure were discussed with the patient, consent was obtained prior to the procedure, and to the best of my judgement the patient seemed to understand and agree to the procedure.    PROCEDURE:  The patient  was brought into the procedure suite and informed consent was reviewed and confirmed. Vital signs were obtained prior to the procedure: .   The patient was escorted the procedure room.  She was positioned lithotomy position.  Perineum was prepped in the usual fashion.  Intraurethral 2% viscous lidocaine jelly was used for local analgesia.  A 16 Swedish flexible cystourethroscope was inserted into the urethra.     Upon entering the bladder the entire bladder was surveyed in a 360 degree fashion.  The left and right ureteral orifices were in normal orthotopic position effluxing clear yellow urine, bilaterally.   There was no evidence of any bladder lesions, foreign objects, stones or evidence of any mucosal changes. The cystoscope was then retroflexed.  The bladder neck was then further examined without any evidence of lesions. The scope was then removed and in an antegrade fashion, the urethra and bladder were again resurveyed with no evidence of additional lesions.  The cystoscope was then fully removed.   The patient tolerated the procedure well.  The patient was able to void and was discharged home.  Verbal and written Post procedure instructions were reviewed with the patient.    Urine dip: No results found for this or any previous visit (from the past 6 hour(s)).  Component  Ref Range & Units 11 d ago 2 mo ago   POC Color, Urine  Straw, Yellow, Light-Yellow Yellow Yellow   POC Appearance, Urine  Clear Clear Clear   POC Glucose, Urine  NEGATIVE mg/dl NEGATIVE NEGATIVE   POC Bilirubin, Urine  NEGATIVE NEGATIVE NEGATIVE   POC Ketones, Urine  NEGATIVE mg/dl NEGATIVE NEGATIVE   POC Specific Gravity, Urine  1.005 - 1.035 1.020 1.015   POC Blood,  Urine  NEGATIVE MODERATE (2+) Abnormal  MODERATE (2+) Abnormal    POC PH, Urine  No Reference Range Established PH 6.5 5.5   POC Protein, Urine  NEGATIVE, 30 (1+) mg/dl NEGATIVE NEGATIVE   POC Urobilinogen, Urine  0.2, 1.0 EU/DL 0.2 0.2   Poc Nitrite, Urine  NEGATIVE NEGATIVE NEGATIVE   POC Leukocytes, Urine  NEGATIVE TRACE Abnormal  NEGATIVE                  IMPRESSION AND PLAN:  Gena Baltazar is a 54 y.o.  who presents  with asymptomatic microscopic hematuria for a cystoscopy.    Problem List Items Addressed This Visit    None  Visit Diagnoses       Asymptomatic microscopic hematuria        Relevant Orders    POCT UA Automated manually resulted    Cystoscopy           We did not find any issues in the patient's bladder that could lead to asymptomatic hematuria.   We advised the patient on the importance of quitting smoking and it's effect on her health.     Follow up with Stephanie Smith CNP    Scribe Attestation  By signing my name below, Geovanna KOLB Scribe   attest that this documentation has been prepared under the direction and in the presence of Emmanuel Choi MD.    Time IN: 216  Time OUT: 227

## 2024-08-22 ENCOUNTER — APPOINTMENT (OUTPATIENT)
Dept: UROLOGY | Facility: CLINIC | Age: 54
End: 2024-08-22
Payer: MEDICAID

## 2024-08-22 VITALS
DIASTOLIC BLOOD PRESSURE: 86 MMHG | BODY MASS INDEX: 27.64 KG/M2 | HEIGHT: 63 IN | WEIGHT: 156 LBS | HEART RATE: 68 BPM | SYSTOLIC BLOOD PRESSURE: 127 MMHG

## 2024-08-22 DIAGNOSIS — R31.21 ASYMPTOMATIC MICROSCOPIC HEMATURIA: ICD-10-CM

## 2024-08-22 LAB
POC APPEARANCE, URINE: CLEAR
POC BILIRUBIN, URINE: NEGATIVE
POC BLOOD, URINE: ABNORMAL
POC COLOR, URINE: YELLOW
POC GLUCOSE, URINE: NEGATIVE MG/DL
POC KETONES, URINE: NEGATIVE MG/DL
POC LEUKOCYTES, URINE: ABNORMAL
POC NITRITE,URINE: NEGATIVE
POC PH, URINE: 6.5 PH
POC PROTEIN, URINE: NEGATIVE MG/DL
POC SPECIFIC GRAVITY, URINE: 1.02
POC UROBILINOGEN, URINE: 0.2 EU/DL

## 2024-08-22 PROCEDURE — 52000 CYSTOURETHROSCOPY: CPT | Performed by: UROLOGY

## 2024-08-22 PROCEDURE — 81003 URINALYSIS AUTO W/O SCOPE: CPT | Performed by: UROLOGY

## 2024-08-27 ENCOUNTER — ANESTHESIA EVENT (OUTPATIENT)
Dept: GASTROENTEROLOGY | Facility: EXTERNAL LOCATION | Age: 54
End: 2024-08-27

## 2024-08-28 ENCOUNTER — APPOINTMENT (OUTPATIENT)
Dept: GASTROENTEROLOGY | Facility: HOSPITAL | Age: 54
End: 2024-08-28
Payer: MEDICAID

## 2024-08-28 ENCOUNTER — ANESTHESIA (OUTPATIENT)
Dept: GASTROENTEROLOGY | Facility: EXTERNAL LOCATION | Age: 54
End: 2024-08-28

## 2024-08-28 ENCOUNTER — HOSPITAL ENCOUNTER (OUTPATIENT)
Dept: GASTROENTEROLOGY | Facility: EXTERNAL LOCATION | Age: 54
Discharge: HOME | End: 2024-08-28
Payer: MEDICAID

## 2024-08-28 VITALS
BODY MASS INDEX: 27.46 KG/M2 | RESPIRATION RATE: 18 BRPM | DIASTOLIC BLOOD PRESSURE: 78 MMHG | OXYGEN SATURATION: 94 % | HEIGHT: 63 IN | HEART RATE: 66 BPM | SYSTOLIC BLOOD PRESSURE: 118 MMHG | TEMPERATURE: 97.9 F | WEIGHT: 155 LBS

## 2024-08-28 DIAGNOSIS — K21.00 GASTROESOPHAGEAL REFLUX DISEASE WITH ESOPHAGITIS WITHOUT HEMORRHAGE: ICD-10-CM

## 2024-08-28 PROCEDURE — 43239 EGD BIOPSY SINGLE/MULTIPLE: CPT | Performed by: INTERNAL MEDICINE

## 2024-08-28 RX ORDER — LIDOCAINE HYDROCHLORIDE 20 MG/ML
INJECTION, SOLUTION INFILTRATION; PERINEURAL AS NEEDED
Status: DISCONTINUED | OUTPATIENT
Start: 2024-08-28 | End: 2024-08-28

## 2024-08-28 RX ORDER — PROPOFOL 10 MG/ML
INJECTION, EMULSION INTRAVENOUS AS NEEDED
Status: DISCONTINUED | OUTPATIENT
Start: 2024-08-28 | End: 2024-08-28

## 2024-08-28 RX ORDER — SODIUM CHLORIDE 9 MG/ML
INJECTION, SOLUTION INTRAVENOUS CONTINUOUS PRN
Status: DISCONTINUED | OUTPATIENT
Start: 2024-08-28 | End: 2024-08-28

## 2024-08-28 RX ORDER — SODIUM CHLORIDE, SODIUM LACTATE, POTASSIUM CHLORIDE, CALCIUM CHLORIDE 600; 310; 30; 20 MG/100ML; MG/100ML; MG/100ML; MG/100ML
20 INJECTION, SOLUTION INTRAVENOUS CONTINUOUS
Status: DISCONTINUED | OUTPATIENT
Start: 2024-08-28 | End: 2024-08-29 | Stop reason: HOSPADM

## 2024-08-28 ASSESSMENT — COLUMBIA-SUICIDE SEVERITY RATING SCALE - C-SSRS
1. IN THE PAST MONTH, HAVE YOU WISHED YOU WERE DEAD OR WISHED YOU COULD GO TO SLEEP AND NOT WAKE UP?: NO
2. HAVE YOU ACTUALLY HAD ANY THOUGHTS OF KILLING YOURSELF?: NO
6. HAVE YOU EVER DONE ANYTHING, STARTED TO DO ANYTHING, OR PREPARED TO DO ANYTHING TO END YOUR LIFE?: NO

## 2024-08-28 ASSESSMENT — PAIN - FUNCTIONAL ASSESSMENT
PAIN_FUNCTIONAL_ASSESSMENT: 0-10

## 2024-08-28 ASSESSMENT — PAIN SCALES - GENERAL
PAINLEVEL_OUTOF10: 0 - NO PAIN
PAIN_LEVEL: 0
PAINLEVEL_OUTOF10: 0 - NO PAIN

## 2024-08-28 NOTE — DISCHARGE INSTRUCTIONS
Patient Instructions Post Procedure      The anesthetics, sedatives or narcotics which were given to you today will be acting in your body for the next 24 hours, so you might feel a little sleepy or groggy.  This feeling should slowly wear off. Carefully read and follow the instructions.     You received sedation today:  - Do not drive or operate any machinery or power tools of any kind.   - No alcoholic beverages today, not even beer or wine.  - Do not make any important decisions or sign any legal documents.  - No over the counter medications that contain alcohol or that may cause drowsiness.    While it is common to experience mild to moderate abdominal distention, gas, or belching after your procedure, if any of these symptoms occur following discharge from the GI Lab or within one week of having your procedure, call the [facility] to be advised whether a visit to your nearest Urgent Care or Emergency Department is indicated.  Take this paper with you if you go.   - If you develop an allergic reaction to the medications that were given during your procedure such as difficulty breathing, rash, hives, severe nausea, vomiting or lightheadedness.  - If you experience chest pain, shortness of breath, severe abdominal pain, fevers and chills.  -If you develop signs and symptoms of bleeding such as blood in your spit, if your stools turn black, tarry, or bloody  - If you have not urinated within 8 hours following your procedure.  - If your IV site becomes painful, red, inflamed, or looks infected.    If you received a biopsy/polypectomy/sphincterotomy the following instructions apply below:  __ Do not use Aspirin containing products, non-steroidal medications or anti-coagulants for one week following your procedure. (Examples of these types of medications are: Advil, Arthrotec, Aleve, Coumadin, Ecotrin, Heparin, Ibuprofen, Indocin, Motrin, Naprosyn, Nuprin, Plavix, Vioxx, and Voltarin, or their generic  forms.  This list is not all-inclusive.  Check with your physician or pharmacist before resuming medications.)   __ Eat a soft diet today.  Avoid foods that are poorly digested for the next 24 hours.  These foods would include: nuts, beans, lettuce, red meats, and fried foods. Start with liquids and advance your diet as tolerated, gradually work up to eating solids.   __ Do not have a Barium Study or Enema for one week.    Your physician recommends the additional following instructions:    -You have a contact number available for emergencies. The signs and symptoms of potential delayed complications were discussed with you. You may return to normal activities tomorrow.  -Resume your previous diet or other if specified.  -Continue your present medications.   -We are waiting for your pathology results, if applicable.  -The findings and recommendations have been discussed with you and/or family.  - Please see Medication Reconciliation Form for new medication/medications prescribed.     If you experience any problems or have any questions following discharge from the GI Lab, please call: [phone] from 7 am- 4:30 pm.  In the event of an emergency please go to the closest Emergency Department or call    [phone]

## 2024-08-28 NOTE — ANESTHESIA PREPROCEDURE EVALUATION
Patient: Gena Baltazar    Procedure Information       Date/Time: 08/28/24 1300    Scheduled providers: Andi Coley MD    Procedure: EGD    Location: [facility]            Relevant Problems   Cardiac   (+) Abnormal EKG   (+) Chest pain   (+) Essential hypertension   (+) Hypercholesterolemia      Pulmonary   (+) SOB (shortness of breath) on exertion      Neuro   (+) Aneurysm of internal carotid artery ([facility]HS-HCC)   (+) Sciatica      Endocrine   (+) Hypothyroidism      HEENT   (+) Hearing loss       Clinical information reviewed:   Tobacco  Allergies  Meds   Med Hx  Surg Hx  OB Status  Fam Hx  Soc   Hx        NPO Detail:  NPO/Void Status  Date of Last Liquid: 08/28/24  Time of Last Liquid: 0000  Date of Last Solid: 08/27/24  Time of Last Solid: 2100         Physical Exam    Airway  Mallampati: II  TM distance: >3 FB  Neck ROM: full     Cardiovascular - normal exam     Dental - normal exam     Pulmonary - normal exam     Abdominal - normal exam         Anesthesia Plan    History of general anesthesia?: no  History of complications of general anesthesia?: no    ASA 2     MAC     intravenous induction   Anesthetic plan and risks discussed with patient.

## 2024-08-28 NOTE — ANESTHESIA POSTPROCEDURE EVALUATION
Patient: Gena Baltazar    Procedure Summary       Date: 08/28/24 Room / Location: [facility]    Anesthesia Start: 1241 Anesthesia Stop: 1257    Procedure: EGD Diagnosis: Gastroesophageal reflux disease with esophagitis without hemorrhage    Scheduled Providers: Andi Coley MD Responsible Provider: JOSE MIGUEL Peña    Anesthesia Type: MAC ASA Status: 2            Anesthesia Type: MAC    Vitals Value Taken Time   /74 08/28/24 1252   Temp 36.6 °C (97.9 °F) 08/28/24 1252   Pulse 69 08/28/24 1252   Resp 20 08/28/24 1252   SpO2 92 % 08/28/24 1252       Anesthesia Post Evaluation    Patient location during evaluation: PACU  Patient participation: waiting for patient participation  Level of consciousness: responsive to verbal stimuli  Pain score: 0  Pain management: adequate  Airway patency: patent  Cardiovascular status: blood pressure returned to baseline  Respiratory status: acceptable  Hydration status: acceptable  Postoperative Nausea and Vomiting: none    No notable events documented.

## 2024-08-28 NOTE — H&P
Procedure H&P    Patient Profile-Procedures  Name Gena Baltazar  Date of Birth 1970  MRN [id number]  Address   [address] [place] [id number] [place] [id number]    Primary Phone Number [phone]  Secondary Phone Number    Tiarra Sepulveda    Procedure(s):  Procedures: EGD  Primary contact name and number   Extended Emergency Contact Information  Primary Emergency Contact: Deandra Baltazar  Home Phone: [phone]  Relation: Parent    General Health  Weight   Vitals:    08/28/24 1218   Weight: 70.3 kg (155 lb)     BMI Body mass index is 27.46 kg/m².    Allergies  Allergies   Allergen Reactions    Penicillins Unknown    Doxycycline Rash    Nalbuphine Drowsiness and Other     Other reaction(s): patient states she slept 2days       Past Medical History   History reviewed. No pertinent past medical history.    Provider assessment  Diagnosis: GERD  Medication Reviewed - yes  Prior to Admission medications    Medication Sig Start Date End Date Taking? Authorizing Provider   erythromycin (Hemant-Tab) 250 mg EC tablet Take 1 tablet (250 mg) by mouth once daily. Do not crush, chew, or split.   Yes Historical Provider, MD   levothyroxine (Synthroid, Levoxyl) 88 mcg tablet Take 1 tablet (88 mcg) by mouth once daily. 2/23/22  Yes Historical Provider, MD   oxyCODONE-acetaminophen (Percocet) 5-325 mg tablet Take by mouth. 1/7/21  Yes Historical Provider, MD   triamterene-hydrochlorothiazid (Dyazide) 37.5-25 mg capsule Take 1 capsule by mouth once daily. 5/3/24  Yes Historical Provider, MD   turmeric 400 mg capsule Take 1 capsule by mouth once daily.   Yes Historical Provider, MD   vitamin E 180 mg (400 unit) capsule Take 1 capsule (400 Units) by mouth once daily.   Yes Historical Provider, MD   wheat dextrin (Benefiber Healthy Shape) 5 gram/7.4 gram powder Take 1 teaspoon daily 8/8/24  Yes Leanna Duggan APRN-CNP   cholecalciferol (Vitamin D-3) 25 MCG (1000 UT) capsule Take 1 capsule (25 mcg) by  mouth once daily. 7/21/22   Historical Provider, MD   dulaglutide (Trulicity) 0.75 mg/0.5 mL pen injector Inject 1.5 mg under the skin. 5/12/23   Historical Provider, MD   solifenacin (VESIcare) 5 mg tablet Take 1 tablet (5 mg) by mouth once daily. Swallow tablet whole; do not crush, chew, or split.  Patient not taking: Reported on 8/28/2024 6/28/24 6/28/25  IMELDA Eaton   vibegron 75 mg tablet Take 1 tablet (75 mg) by mouth once daily. 6/26/24 8/22/24  IMELDA Eaton   vibegron 75 mg tablet Take 1 tablet (75 mg) by mouth once daily for 28 doses. 6/26/24 8/22/24  IMELDA Eaton       Physical Exam  Vitals:    08/28/24 1218   BP: 123/77   Pulse: 73   Resp: 24   Temp: 36.8 °C (98.2 °F)   SpO2: 91%        General: A&Ox3, NAD.  HEENT: AT/NC.   CV: RRR. No murmur.  Resp: CTA bilaterally. No wheezing, rhonchi or rales.   GI: Soft, NT/ND. BSx4.  Extrem: No edema. Pulses intact.  Neuro: No focal deficits.   Psych: Normal mood and affect.      Procedure Plan - pre-procedural (re)assesment completed by physician:  discharge/transfer patient when discharge criteria met    ASA status 2  Mallampati score 2    Andi Coley MD  8/28/2024 12:42 PM

## 2024-08-29 ENCOUNTER — APPOINTMENT (OUTPATIENT)
Dept: UROLOGY | Facility: CLINIC | Age: 54
End: 2024-08-29
Payer: MEDICAID

## 2024-09-02 RX ORDER — LIDOCAINE HYDROCHLORIDE 20 MG/ML
1 JELLY TOPICAL ONCE
Status: SHIPPED | OUTPATIENT
Start: 2024-09-02

## 2024-09-06 LAB
LABORATORY COMMENT REPORT: NORMAL
PATH REPORT.FINAL DX SPEC: NORMAL
PATH REPORT.GROSS SPEC: NORMAL
PATH REPORT.TOTAL CANCER: NORMAL

## 2024-09-09 DIAGNOSIS — K21.00 GASTROESOPHAGEAL REFLUX DISEASE WITH ESOPHAGITIS WITHOUT HEMORRHAGE: Primary | ICD-10-CM

## 2024-10-04 RX ORDER — ALBUTEROL SULFATE 0.83 MG/ML
2.5 SOLUTION RESPIRATORY (INHALATION) EVERY 6 HOURS PRN
COMMUNITY

## 2024-10-07 ENCOUNTER — HOSPITAL ENCOUNTER (OUTPATIENT)
Dept: GASTROENTEROLOGY | Facility: HOSPITAL | Age: 54
Setting detail: OUTPATIENT SURGERY
Discharge: HOME | End: 2024-10-07
Payer: MEDICAID

## 2024-10-07 VITALS
DIASTOLIC BLOOD PRESSURE: 76 MMHG | RESPIRATION RATE: 18 BRPM | OXYGEN SATURATION: 95 % | SYSTOLIC BLOOD PRESSURE: 120 MMHG | HEART RATE: 59 BPM

## 2024-10-07 DIAGNOSIS — K21.00 GASTROESOPHAGEAL REFLUX DISEASE WITH ESOPHAGITIS WITHOUT HEMORRHAGE: ICD-10-CM

## 2024-10-07 PROCEDURE — 2720000007 HC OR 272 NO HCPCS

## 2024-10-07 PROCEDURE — 91010 ESOPHAGUS MOTILITY STUDY: CPT

## 2024-10-07 PROCEDURE — 91034 GASTROESOPHAGEAL REFLUX TEST: CPT

## 2024-10-07 PROCEDURE — 91010 ESOPHAGUS MOTILITY STUDY: CPT | Performed by: INTERNAL MEDICINE

## 2024-10-07 RX ORDER — LIDOCAINE HYDROCHLORIDE 20 MG/ML
1 JELLY TOPICAL ONCE
Status: CANCELLED | OUTPATIENT
Start: 2024-10-07

## 2024-10-08 PROCEDURE — 91038 ESOPH IMPED FUNCT TEST > 1HR: CPT | Performed by: INTERNAL MEDICINE

## 2024-10-09 ENCOUNTER — TELEPHONE (OUTPATIENT)
Dept: GASTROENTEROLOGY | Facility: CLINIC | Age: 54
End: 2024-10-09
Payer: MEDICAID

## 2024-10-09 NOTE — TELEPHONE ENCOUNTER
Attempted to reach patient to schedule virtual visit with dr ashford to discuss manometry results. LMOM

## 2024-10-15 ENCOUNTER — APPOINTMENT (OUTPATIENT)
Dept: UROLOGY | Facility: CLINIC | Age: 54
End: 2024-10-15
Payer: MEDICAID

## 2024-10-15 VITALS
SYSTOLIC BLOOD PRESSURE: 116 MMHG | HEIGHT: 63 IN | WEIGHT: 155 LBS | BODY MASS INDEX: 27.46 KG/M2 | DIASTOLIC BLOOD PRESSURE: 70 MMHG | HEART RATE: 84 BPM | TEMPERATURE: 97 F

## 2024-10-15 DIAGNOSIS — R31.21 ASYMPTOMATIC MICROSCOPIC HEMATURIA: ICD-10-CM

## 2024-10-15 DIAGNOSIS — R35.0 URINARY FREQUENCY: Primary | ICD-10-CM

## 2024-10-15 DIAGNOSIS — N39.41 URGENCY INCONTINENCE: ICD-10-CM

## 2024-10-15 DIAGNOSIS — R39.15 URGENCY OF URINATION: ICD-10-CM

## 2024-10-15 LAB
POC APPEARANCE, URINE: CLEAR
POC BILIRUBIN, URINE: NEGATIVE
POC BLOOD, URINE: ABNORMAL
POC COLOR, URINE: YELLOW
POC GLUCOSE, URINE: NEGATIVE MG/DL
POC KETONES, URINE: NEGATIVE MG/DL
POC LEUKOCYTES, URINE: NEGATIVE
POC NITRITE,URINE: NEGATIVE
POC PH, URINE: 6.5 PH
POC PROTEIN, URINE: NEGATIVE MG/DL
POC SPECIFIC GRAVITY, URINE: 1.02
POC UROBILINOGEN, URINE: 0.2 EU/DL

## 2024-10-15 PROCEDURE — 3078F DIAST BP <80 MM HG: CPT | Performed by: NURSE PRACTITIONER

## 2024-10-15 PROCEDURE — 81003 URINALYSIS AUTO W/O SCOPE: CPT | Performed by: NURSE PRACTITIONER

## 2024-10-15 PROCEDURE — 3074F SYST BP LT 130 MM HG: CPT | Performed by: NURSE PRACTITIONER

## 2024-10-15 PROCEDURE — 3008F BODY MASS INDEX DOCD: CPT | Performed by: NURSE PRACTITIONER

## 2024-10-15 PROCEDURE — 99213 OFFICE O/P EST LOW 20 MIN: CPT | Performed by: NURSE PRACTITIONER

## 2024-10-15 PROCEDURE — 51798 US URINE CAPACITY MEASURE: CPT | Performed by: NURSE PRACTITIONER

## 2024-10-15 NOTE — PROGRESS NOTES
"10/15/24   [id number]    Follow up frequency, urgency, incontinence, hematuria;      Subjective      HPI Gena Baltazar is a 54 y.o. female who presents for urinary frequency, urgency, urge incontinence, hematuria;     Solifenacin 5 mg daily helping, no increased dry mouth or constipation; improved symptoms > 50%;     No gross hematuria, no UTIs;     PVR 0 ml, UA mod heme,   3-5 rbc/hpf 6/20/24    Saw me and Dr. Choi in 2022; considered procedure for small prolapse at that time;     Cystoscopy w Dr. Choi 8/22/24 normal    CT abd/pel wo IV: 6/20/24  KIDNEYS AND URETERS:  No hydronephrosis or renal masses. No perinephric stranding or fluid collection. No radiodense renal calculi.   BLADDER: Within normal limits as distended. No bladder calculi or masses.    Last menses at age 27 ablation then    Smoked > 20 yrs < 1 ppd  Cleans for living w chemicals  No urological cancers in her family specifically known, maybe as all aunts/uncles have had cancer     Objective     /70   Pulse 84   Temp 36.1 °C (97 °F)   Ht 1.6 m (5' 3\")   Wt 70.3 kg (155 lb)   BMI 27.46 kg/m²    Physical Exam  General: Appears comfortable and in no apparent distress, well nourished  Head: Normocephalic, atraumatic  Neck: trachea midline  Respiratory: respirations unlabored, no wheezes, and no use of accessory muscles  Cardiovascular: at rest no dyspnea, well perfused  Skin: no visible rashes or lesions  Neurologic: grossly intact, oriented to person, place, and time  Psychiatric: mood and affect appropriate  Musculoskeletal: in chair for appt. no difficulty w upper body movement    Assessment/Plan   Problem List Items Addressed This Visit    None  Visit Diagnoses       Urinary frequency    -  Primary    Relevant Orders    POCT UA Automated manually resulted (Completed)    Post-Void Residual (Completed)    Urgency of urination        Urgency incontinence        Asymptomatic microscopic hematuria                Orders Placed This Encounter "   Procedures    Post-Void Residual    POCT UA Automated manually resulted     Order Specific Question:   Release result to [facility]     Answer:   Immediate [1]      Microscopic hematuria  Imaging and cystoscopy negative    Overactive bladder, hx small prolapse  Gemtesa not covered until trial another medicine  Solifenacin 5 mg happy w dose  Follow up 6 mos, OAB    Nurse line [phone]         Stephanie Smith, APRN-CNP  Lab Results   Component Value Date    GLUCOSE 83 06/20/2024    CALCIUM 10.0 06/20/2024     06/20/2024    K 3.7 06/20/2024    CO2 25 06/20/2024     06/20/2024    BUN 14 06/20/2024    CREATININE 0.86 06/20/2024

## 2024-10-15 NOTE — PATIENT INSTRUCTIONS
Microscopic hematuria  Imaging and cystoscopy negative    Overactive bladder, hx small prolapse  Gemtesa not covered until trial another medicine  Solifenacin 5 mg happy w dose  Follow up 6 mos, OAB    Nurse line [phone]

## 2024-10-30 ENCOUNTER — APPOINTMENT (OUTPATIENT)
Dept: GASTROENTEROLOGY | Facility: CLINIC | Age: 54
End: 2024-10-30
Payer: MEDICAID

## 2024-10-30 VITALS
BODY MASS INDEX: 27.18 KG/M2 | HEART RATE: 83 BPM | SYSTOLIC BLOOD PRESSURE: 130 MMHG | DIASTOLIC BLOOD PRESSURE: 82 MMHG | HEIGHT: 63 IN | WEIGHT: 153.4 LBS

## 2024-10-30 DIAGNOSIS — K22.89 ESOPHAGOGASTRIC JUNCTION OUTFLOW OBSTRUCTION: ICD-10-CM

## 2024-10-30 DIAGNOSIS — K22.4 HYPERCONTRACTILE ESOPHAGUS: Primary | ICD-10-CM

## 2024-10-30 DIAGNOSIS — K31.89 ESOPHAGOGASTRIC JUNCTION OUTFLOW OBSTRUCTION: ICD-10-CM

## 2024-10-30 PROCEDURE — 3075F SYST BP GE 130 - 139MM HG: CPT | Performed by: INTERNAL MEDICINE

## 2024-10-30 PROCEDURE — 99214 OFFICE O/P EST MOD 30 MIN: CPT | Performed by: INTERNAL MEDICINE

## 2024-10-30 PROCEDURE — 3008F BODY MASS INDEX DOCD: CPT | Performed by: INTERNAL MEDICINE

## 2024-10-30 PROCEDURE — 3079F DIAST BP 80-89 MM HG: CPT | Performed by: INTERNAL MEDICINE

## 2024-10-30 RX ORDER — SILDENAFIL 50 MG/1
50 TABLET, FILM COATED ORAL 2 TIMES DAILY
Qty: 60 TABLET | Refills: 1 | Status: SHIPPED | OUTPATIENT
Start: 2024-10-30 | End: 2024-12-29

## 2024-10-30 ASSESSMENT — ENCOUNTER SYMPTOMS
CONSTITUTIONAL NEGATIVE: 1
MUSCULOSKELETAL NEGATIVE: 1
CARDIOVASCULAR NEGATIVE: 1
RESPIRATORY NEGATIVE: 1

## 2024-11-14 ENCOUNTER — APPOINTMENT (OUTPATIENT)
Dept: NEPHROLOGY | Facility: CLINIC | Age: 54
End: 2024-11-14
Payer: MEDICAID

## 2024-11-14 ENCOUNTER — LAB (OUTPATIENT)
Dept: LAB | Facility: LAB | Age: 54
End: 2024-11-14
Payer: MEDICAID

## 2024-11-14 VITALS
SYSTOLIC BLOOD PRESSURE: 125 MMHG | BODY MASS INDEX: 25.88 KG/M2 | DIASTOLIC BLOOD PRESSURE: 76 MMHG | HEIGHT: 64 IN | WEIGHT: 151.6 LBS | HEART RATE: 79 BPM

## 2024-11-14 DIAGNOSIS — N18.2 CKD (CHRONIC KIDNEY DISEASE) STAGE 2, GFR 60-89 ML/MIN: ICD-10-CM

## 2024-11-14 DIAGNOSIS — I12.9 BENIGN HYPERTENSIVE CKD, STAGE 1-4 OR UNSPECIFIED CHRONIC KIDNEY DISEASE: Primary | ICD-10-CM

## 2024-11-14 DIAGNOSIS — I12.9 BENIGN HYPERTENSIVE CKD, STAGE 1-4 OR UNSPECIFIED CHRONIC KIDNEY DISEASE: ICD-10-CM

## 2024-11-14 DIAGNOSIS — I10 ESSENTIAL HYPERTENSION: ICD-10-CM

## 2024-11-14 DIAGNOSIS — E55.9 VITAMIN D DEFICIENCY: ICD-10-CM

## 2024-11-14 DIAGNOSIS — R31.21 ASYMPTOMATIC MICROSCOPIC HEMATURIA: ICD-10-CM

## 2024-11-14 LAB
ALBUMIN SERPL BCP-MCNC: 4.6 G/DL (ref 3.4–5)
ANION GAP SERPL CALC-SCNC: 15 MMOL/L (ref 10–20)
APPEARANCE UR: CLEAR
BILIRUB UR STRIP.AUTO-MCNC: NEGATIVE MG/DL
BUN SERPL-MCNC: 17 MG/DL (ref 6–23)
CALCIUM SERPL-MCNC: 10.3 MG/DL (ref 8.6–10.6)
CHLORIDE SERPL-SCNC: 102 MMOL/L (ref 98–107)
CO2 SERPL-SCNC: 28 MMOL/L (ref 21–32)
COLOR UR: ABNORMAL
CREAT SERPL-MCNC: 0.93 MG/DL (ref 0.5–1.05)
CREAT UR-MCNC: 44.7 MG/DL (ref 20–320)
CREAT UR-MCNC: 45.2 MG/DL (ref 20–320)
EGFRCR SERPLBLD CKD-EPI 2021: 72 ML/MIN/1.73M*2
GLUCOSE SERPL-MCNC: 83 MG/DL (ref 74–99)
GLUCOSE UR STRIP.AUTO-MCNC: NORMAL MG/DL
KETONES UR STRIP.AUTO-MCNC: NEGATIVE MG/DL
LEUKOCYTE ESTERASE UR QL STRIP.AUTO: ABNORMAL
MICROALBUMIN UR-MCNC: <7 MG/L
MICROALBUMIN/CREAT UR: NORMAL MG/G{CREAT}
MUCOUS THREADS #/AREA URNS AUTO: NORMAL /LPF
NITRITE UR QL STRIP.AUTO: NEGATIVE
PH UR STRIP.AUTO: 7 [PH]
PHOSPHATE SERPL-MCNC: 4.1 MG/DL (ref 2.5–4.9)
POTASSIUM SERPL-SCNC: 4 MMOL/L (ref 3.5–5.3)
PROT UR STRIP.AUTO-MCNC: NEGATIVE MG/DL
PROT UR-ACNC: 4 MG/DL (ref 5–24)
PROT/CREAT UR: 0.09 MG/MG CREAT (ref 0–0.17)
RBC # UR STRIP.AUTO: ABNORMAL /UL
RBC #/AREA URNS AUTO: NORMAL /HPF
SODIUM SERPL-SCNC: 141 MMOL/L (ref 136–145)
SP GR UR STRIP.AUTO: 1.01
SQUAMOUS #/AREA URNS AUTO: NORMAL /HPF
UROBILINOGEN UR STRIP.AUTO-MCNC: NORMAL MG/DL
WBC #/AREA URNS AUTO: NORMAL /HPF

## 2024-11-14 PROCEDURE — 36415 COLL VENOUS BLD VENIPUNCTURE: CPT

## 2024-11-14 PROCEDURE — 99214 OFFICE O/P EST MOD 30 MIN: CPT | Performed by: INTERNAL MEDICINE

## 2024-11-14 PROCEDURE — 81001 URINALYSIS AUTO W/SCOPE: CPT

## 2024-11-14 PROCEDURE — 3078F DIAST BP <80 MM HG: CPT | Performed by: INTERNAL MEDICINE

## 2024-11-14 PROCEDURE — 84156 ASSAY OF PROTEIN URINE: CPT

## 2024-11-14 PROCEDURE — 82043 UR ALBUMIN QUANTITATIVE: CPT

## 2024-11-14 PROCEDURE — 80069 RENAL FUNCTION PANEL: CPT

## 2024-11-14 PROCEDURE — 82570 ASSAY OF URINE CREATININE: CPT

## 2024-11-14 PROCEDURE — 3008F BODY MASS INDEX DOCD: CPT | Performed by: INTERNAL MEDICINE

## 2024-11-14 PROCEDURE — 3074F SYST BP LT 130 MM HG: CPT | Performed by: INTERNAL MEDICINE

## 2024-11-14 RX ORDER — CETIRIZINE HYDROCHLORIDE 10 MG/1
TABLET ORAL
COMMUNITY
Start: 2024-11-04

## 2024-11-14 RX ORDER — LOSARTAN POTASSIUM 25 MG/1
25 TABLET ORAL DAILY
Qty: 30 TABLET | Refills: 11 | Status: SHIPPED | OUTPATIENT
Start: 2024-11-14 | End: 2025-11-14

## 2024-11-14 RX ORDER — VITAMIN E MIXED 400 UNIT
200 CAPSULE ORAL DAILY
COMMUNITY

## 2024-11-14 ASSESSMENT — PAIN SCALES - GENERAL: PAINLEVEL_OUTOF10: 5

## 2024-11-14 NOTE — PROGRESS NOTES
"NEPHROLOGY OUTPATIENT NOTE     Chief Complaint   Patient presents with    Follow-up     Essential hypertension   CKD stage 2, microscopic hematuria, HTN     HPI  Subjective: Gena Baltazar is a 54 y.o. female with mild CKD 2, without proteinuria, microscopic hematuria, who used to take NSAIDs regularly for HA x 10 years who is here for follow-up as above.   At the time of this visit she is asking whether holistic medicine would be the answer to her complaints   Main thing today is she c/o intermittent facial swelling - however this is not present today   Also would like for someone to look into her \"liver problem\" as the cause for hypothyroidism     Since last seen she had cystoscopy with normal findings so no reason for microscopic hematuria   Denies dysuria, frequency or urgency       PMH:  Hypothyroid   HTN   Neck pain / Shoulder pain  migraine HA almost every day; Tylenol ; Aleve x 2 a day --> > 10 years 4 \"aspirins\" s a day Tylenol or Aleve a day   Cerebral aneurism - follows at [facility]      Review of labs from [facility] :      2018: crt 1.07 BUN 14 ; K 3.6 eGFR 54 --> crt 1.06 --> 2022 crt 0.9   k/l ratio 1.22   Brain aneurism discovered after a fall in Jan 2022   2 pregnancies - no preeclampsia      HTN diagnosed at age 47   HA      Home BP: similar with office, but no recent readings      Feels is coming down with a cold, nasal congestion and post nasal drip     ROS: all reviewed and negative except as above.     Active Ambulatory Problems     Diagnosis Date Noted    Abnormal EKG 10/26/2023    Chest pain 10/26/2023    Elevated serum creatinine 10/26/2023    Essential hypertension 10/26/2023    Hypercholesterolemia 10/26/2023    Leg edema 10/26/2023    Low vitamin D level 10/26/2023    Overactive bladder 10/26/2023    Pelvic pain in female 10/26/2023    Prolapse of female pelvic organs 10/26/2023    SOB (shortness of breath) on exertion 10/26/2023    Uterovaginal prolapse 10/26/2023    Aneurysm of internal carotid " artery ([facility]) 01/12/2022    Cigarette nicotine dependence, uncomplicated 09/04/2017    Hearing loss 02/08/2021    Hypothyroidism 03/04/2010    Diverticular disease of colon 01/09/2020    Sciatica 03/19/2020    Annual physical exam 07/22/2024    Hot flashes due to menopause 07/22/2024     Resolved Ambulatory Problems     Diagnosis Date Noted    Frequency of micturition 10/26/2023     Past Medical History:   Diagnosis Date    Hypertension        Current Outpatient Medications on File Prior to Visit   Medication Sig Dispense Refill    albuterol 2.5 mg /3 mL (0.083 %) nebulizer solution Take 3 mL (2.5 mg) by nebulization every 6 hours if needed for wheezing.      cetirizine (ZyrTEC) 10 mg tablet Take by mouth.      cholecalciferol (Vitamin D-3) 25 MCG (1000 UT) capsule Take 1 capsule (25 mcg) by mouth once daily.      erythromycin (Hemant-Tab) 250 mg EC tablet Take 1 tablet (250 mg) by mouth once daily. Do not crush, chew, or split.      levothyroxine (Synthroid, Levoxyl) 88 mcg tablet Take 1 tablet (88 mcg) by mouth once daily. (Patient taking differently: Take 112 mcg by mouth once daily.)      NON FORMULARY Take 1 each by mouth once daily. Tumeric tablet      oxyCODONE-acetaminophen (Percocet) 5-325 mg tablet Take by mouth. (Patient taking differently: Take by mouth. As needed)      sildenafil (Viagra) 50 mg tablet Take 1 tablet (50 mg) by mouth 2 times a day. (Patient taking differently: Take 1 tablet (50 mg) by mouth 2 times a day as needed. As needed) 60 tablet 1    triamterene-hydrochlorothiazid (Dyazide) 37.5-25 mg capsule Take 1 capsule by mouth once daily.      vitamin E 90 mg (200 unit) capsule Take 1 capsule (200 Units) by mouth once daily.       No current facility-administered medications on file prior to visit.       Allergies   Allergen Reactions    Penicillins Unknown    Doxycycline Rash    Nalbuphine Drowsiness and Other     Other reaction(s): patient states she slept 2days       /76   Pulse 79   " Ht 1.626 m (5' 4\")   Wt 68.8 kg (151 lb 9.6 oz)   BMI 26.02 kg/m²   Constitutional: no acute distress, well appearing and well nourished.   Psychiatric: orientation to person, place, and time. Appropriate mood and affect.   Neurologic: no gross motor deficit    Neck: no thyromegaly, or jugular venous distension   Cardiovascular: normal rate and rhythm, normal S1 and S2, no murmurs. There was no pericardial friction rub.   Pulmonary: equal chest expansion, lungs are clear to auscultation.   Abdomen: soft, non tender to palpation, not distended  Kidneys: bilateral lower poles not palpable and non tender with percussion.   Extremities: No clubbing of the fingernails and no cyanosis and no edema present.   Skin: warm, pink, well conditioned; no rashes or lesions.     Lab Results   Component Value Date    WBC 6.6 06/20/2024    HGB 15.0 06/20/2024    HCT 43.3 06/20/2024    MCV 85 06/20/2024     06/20/2024       Lab Results   Component Value Date    GLUCOSE 83 06/20/2024    CALCIUM 10.0 06/20/2024     06/20/2024    K 3.7 06/20/2024    CO2 25 06/20/2024     06/20/2024    BUN 14 06/20/2024    CREATININE 0.86 06/20/2024       Lab Results   Component Value Date    PTH 30.7 07/21/2022    CALCIUM 10.0 06/20/2024    PHOS 4.0 06/20/2024       Lab Results   Component Value Date    ALBUMIN 4.3 06/20/2024     Assessment/Plan      CKD stage G2 /A0 (eGFR 76 ml/min, crt 0.9 mg/dl, TP/C <4 mg/g) with history of microscopic hematuria and no proteinuria, RBC <5.  Had normal urologic evaluation. Glomerular causes of isolated microscopic hematuria can be a limited number of conditions including IgA N, thin membrane disease , Alports, and MPGN. She has no family hx of Alport's so that is low yield. Prior serology tests unrevealing. Plan to check RFP and quantify proteinuria and UA today ; She would benefit from ACEi/ARB to slow CKD progression - she is agreeable to start low dose  Losartan and hold " hydrochlorothiazide/triamterene. Will up-titrate as allowed by BP She is not interested in pursuing a kidney biopsy at this time.    BP/Volume: seems euvolemic on exam and BP OK (primarily she has diastolic HTN) Plan to hold Triamterene/HCTZ and to start ARB for long term kidney protection. Lack of proteinuria is a good prognostic indicator. Continue Low salt diet and regular exercise.      CKD-MBD: normal calcium, phosphorus, PTH, vit D and bicarbonate levels.      No anemia      Return to clinic in Jan 25 for BP check and labs .    Miladys Benavidez MD [facility]

## 2025-01-09 ENCOUNTER — APPOINTMENT (OUTPATIENT)
Dept: NEPHROLOGY | Facility: CLINIC | Age: 55
End: 2025-01-09
Payer: MEDICAID

## 2025-01-21 PROBLEM — E87.6 HYPOKALEMIA: Status: ACTIVE | Noted: 2024-02-07

## 2025-01-21 PROBLEM — E86.0 DEHYDRATION: Status: ACTIVE | Noted: 2024-10-08

## 2025-01-21 PROBLEM — E06.3 HASHIMOTO THYROIDITIS: Status: ACTIVE | Noted: 2024-09-06

## 2025-01-21 PROBLEM — J44.9 CHRONIC OBSTRUCTIVE PULMONARY DISEASE (MULTI): Status: ACTIVE | Noted: 2019-12-10

## 2025-01-21 PROBLEM — E87.6: Status: ACTIVE | Noted: 2024-11-05

## 2025-01-21 PROBLEM — R94.4 RENAL FUNCTION TEST ABNORMAL: Status: ACTIVE | Noted: 2024-10-03

## 2025-01-21 PROBLEM — N18.30 STAGE 3 CHRONIC KIDNEY DISEASE (MULTI): Status: ACTIVE | Noted: 2025-01-21

## 2025-01-21 PROBLEM — D84.89: Status: ACTIVE | Noted: 2024-05-08

## 2025-01-21 PROBLEM — F17.200 NICOTINE DEPENDENCE: Status: ACTIVE | Noted: 2022-01-13

## 2025-01-21 PROBLEM — R79.89 ELEVATED BRAIN NATRIURETIC PEPTIDE (BNP) LEVEL: Status: ACTIVE | Noted: 2024-05-06

## 2025-01-21 PROBLEM — E83.51 HYPOCALCEMIA: Status: ACTIVE | Noted: 2024-05-06

## 2025-01-21 PROBLEM — E79.0 ELEVATED BLOOD URIC ACID LEVEL: Status: ACTIVE | Noted: 2024-11-05

## 2025-01-21 PROBLEM — D72.829 LEUKOCYTOSIS: Status: ACTIVE | Noted: 2024-10-03

## 2025-01-28 ENCOUNTER — APPOINTMENT (OUTPATIENT)
Dept: GASTROENTEROLOGY | Facility: CLINIC | Age: 55
End: 2025-01-28
Payer: MEDICAID

## 2025-02-06 ENCOUNTER — APPOINTMENT (OUTPATIENT)
Dept: NEPHROLOGY | Facility: CLINIC | Age: 55
End: 2025-02-06
Payer: MEDICAID

## 2025-02-06 VITALS
BODY MASS INDEX: 25.78 KG/M2 | WEIGHT: 151 LBS | DIASTOLIC BLOOD PRESSURE: 85 MMHG | HEART RATE: 89 BPM | HEIGHT: 64 IN | SYSTOLIC BLOOD PRESSURE: 135 MMHG

## 2025-02-06 DIAGNOSIS — N18.2 CHRONIC KIDNEY DISEASE, STAGE 2 (MILD): ICD-10-CM

## 2025-02-06 DIAGNOSIS — R31.29 OTHER MICROSCOPIC HEMATURIA: ICD-10-CM

## 2025-02-06 DIAGNOSIS — I10 ESSENTIAL HYPERTENSION: Primary | ICD-10-CM

## 2025-02-06 PROCEDURE — 3008F BODY MASS INDEX DOCD: CPT | Performed by: INTERNAL MEDICINE

## 2025-02-06 PROCEDURE — 99214 OFFICE O/P EST MOD 30 MIN: CPT | Performed by: INTERNAL MEDICINE

## 2025-02-06 PROCEDURE — 3079F DIAST BP 80-89 MM HG: CPT | Performed by: INTERNAL MEDICINE

## 2025-02-06 PROCEDURE — 3075F SYST BP GE 130 - 139MM HG: CPT | Performed by: INTERNAL MEDICINE

## 2025-02-06 RX ORDER — LEVOTHYROXINE SODIUM 112 UG/1
112 TABLET ORAL
COMMUNITY
Start: 2025-01-05

## 2025-02-06 RX ORDER — ALBUTEROL SULFATE 90 UG/1
2 INHALANT RESPIRATORY (INHALATION) EVERY 4 HOURS PRN
COMMUNITY
Start: 2024-03-29

## 2025-02-06 RX ORDER — ERGOCALCIFEROL 1.25 MG/1
1 CAPSULE ORAL
COMMUNITY
Start: 2025-01-22

## 2025-02-06 RX ORDER — FLUTICASONE PROPIONATE 50 MCG
1 SPRAY, SUSPENSION (ML) NASAL DAILY
COMMUNITY
Start: 2024-11-04

## 2025-02-06 RX ORDER — DULAGLUTIDE 1.5 MG/.5ML
1.5 INJECTION, SOLUTION SUBCUTANEOUS WEEKLY
COMMUNITY
Start: 2025-02-03

## 2025-02-06 RX ORDER — AZITHROMYCIN 250 MG/1
250 TABLET, FILM COATED ORAL DAILY
COMMUNITY
Start: 2025-01-22

## 2025-02-06 RX ORDER — GUAIFENESIN AND PHENYLEPHRINE HCL 400; 10 MG/1; MG/1
1 TABLET ORAL DAILY
COMMUNITY
Start: 2024-12-05

## 2025-02-06 NOTE — PROGRESS NOTES
"NEPHROLOGY OUTPATIENT NOTE     Chief Complaint   Patient presents with    Chronic Kidney Disease    Hypertension     Follow up visit for CKD.  1st VQ=122/87, 93; 2nd SX=021/89, 89; 3rd CT=991/79, 84   CKD 2, HTN, microscopic hematuria     HPI  Subjective: Gena Baltazar is a 54 y.o. female with  female with mild CKD 2, without proteinuria, microscopic hematuria, without hx kidney stones, who used to take NSAIDs regularly for HA x 10 years who is here for follow-up as above.   Asking about ozempic for kidney disease      Denies dysuria, frequency or urgency  ; prior cystoscopy with normal findings so no reason for microscopic hematuria     At last visit she was started on low dose losartan which she took x 2 weeks and then stopped because of severe back pain. After that SBP 180s so she self-restarted Triamterene/hydrochlorothiazide with improvement in -135 mmHg     PMH:  Hypothyroid   HTN diagnosed at age 47   Neck pain / Shoulder pain  migraine HA almost every day; Tylenol ; Aleve x 2 a day --> > 10 years 4 \"aspirins\" s a day Tylenol or Aleve a day   Cerebral aneurism - follows at [facility]    Review of labs from [facility] : 2018: crt 1.07 BUN 14 ; K 3.6 eGFR 54 --> crt 1.06 --> 2022 crt 0.9   k/l ratio 1.22   Brain aneurism discovered after a fall in Jan 2022   2 pregnancies - no preeclampsia     ROS: all reviewed and negative except as above.     Active Ambulatory Problems     Diagnosis Date Noted    Abnormal EKG 10/26/2023    Chest pain 10/26/2023    Elevated serum creatinine 10/26/2023    Essential hypertension 10/26/2023    Hypercholesterolemia 10/26/2023    Leg edema 10/26/2023    Low vitamin D level 10/26/2023    Overactive bladder 10/26/2023    Pelvic pain in female 10/26/2023    Prolapse of female pelvic organs 10/26/2023    SOB (shortness of breath) on exertion 10/26/2023    Uterovaginal prolapse 10/26/2023    Aneurysm of internal carotid artery ([facility]) 01/12/2022    Cigarette nicotine dependence, " uncomplicated 09/04/2017    Hearing loss 02/08/2021    Hypothyroidism 03/04/2010    Diverticular disease of colon 01/09/2020    Sciatica 03/19/2020    Annual physical exam 07/22/2024    Hot flashes due to menopause 07/22/2024    Stage 3 chronic kidney disease (Multi) 01/21/2025    Serum potassium level below reference range 11/05/2024    Renal function test abnormal 10/03/2024    Nicotine dependence 01/13/2022    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    Leukocytosis 10/03/2024    Hypokalemia 02/07/2024    Hypocalcemia 05/06/2024    Hashimoto thyroiditis 09/06/2024    Dehydration 10/08/2024    Chronic obstructive pulmonary disease ([facility]) 12/10/2019    Elevated blood uric acid level 11/05/2024    Elevated brain natriuretic peptide (BNP) level 05/06/2024     Resolved Ambulatory Problems     Diagnosis Date Noted    Frequency of micturition 10/26/2023     Past Medical History:   Diagnosis Date    Hypertension        Current Outpatient Medications on File Prior to Visit   Medication Sig Dispense Refill    albuterol 90 mcg/actuation inhaler Inhale 2 puffs every 4 hours if needed.      azithromycin (Zithromax) 250 mg tablet Take 1 tablet (250 mg) by mouth once daily.      cetirizine (ZyrTEC) 10 mg tablet Take by mouth.      ergocalciferol (Vitamin D-2) 1.25 MG (89839 UT) capsule Take 1 capsule (1,250 mcg) by mouth every 7 days.      erythromycin (Hemant-Tab) 250 mg EC tablet Take 1 tablet (250 mg) by mouth once daily. Do not crush, chew, or split.      fluticasone (Flonase) 50 mcg/actuation nasal spray Administer 1 spray into each nostril once daily.      levothyroxine (Synthroid, Levoxyl) 112 mcg tablet Take 1 tablet (112 mcg) by mouth once daily in the morning. Take before meals. Take on an empty stomach.      oxyCODONE-acetaminophen (Percocet) 5-325 mg tablet Take by mouth.      Trulicity 1.5 mg/0.5 mL pen injector injection Inject 1.5 mg under the skin 1 (one) time per week.      turmeric root extract 500  "mg capsule Take 1 capsule by mouth once daily.      vitamin E 90 mg (200 unit) capsule Take 1 capsule (200 Units) by mouth once daily.      [DISCONTINUED] albuterol 2.5 mg /3 mL (0.083 %) nebulizer solution Take 3 mL (2.5 mg) by nebulization every 6 hours if needed for wheezing.      [DISCONTINUED] cholecalciferol (Vitamin D-3) 25 MCG (1000 UT) capsule Take 1 capsule (25 mcg) by mouth once daily. (Patient not taking: Reported on 2/6/2025)      [DISCONTINUED] levothyroxine (Synthroid, Levoxyl) 88 mcg tablet Take 1 tablet (88 mcg) by mouth once daily. (Patient taking differently: Take 112 mcg by mouth once daily.)      [DISCONTINUED] losartan (Cozaar) 25 mg tablet Take 1 tablet (25 mg) by mouth once daily. (Patient not taking: Reported on 2/6/2025) 30 tablet 11    [DISCONTINUED] NON FORMULARY Take 1 each by mouth once daily. Tumeric tablet (Patient not taking: Reported on 2/6/2025)      [DISCONTINUED] sildenafil (Viagra) 50 mg tablet Take 1 tablet (50 mg) by mouth 2 times a day. (Patient taking differently: Take 1 tablet (50 mg) by mouth 2 times a day as needed. As needed) 60 tablet 1     No current facility-administered medications on file prior to visit.       Allergies   Allergen Reactions    Penicillins Unknown    Doxycycline Rash    Nalbuphine Drowsiness and Other     Other reaction(s): patient states she slept 2days       /85 (BP Location: Right arm, Patient Position: Sitting, BP Cuff Size: Adult)   Pulse 89   Ht 1.626 m (5' 4\")   Wt 68.5 kg (151 lb)   BMI 25.92 kg/m²   Constitutional: no acute distress, well appearing and well nourished.   Psychiatric: orientation to person, place, and time. Appropriate mood and affect.   Neurologic: no gross motor deficit    Neck: no thyromegaly, or jugular venous distension   Cardiovascular: normal rate and rhythm, normal S1 and S2, no murmurs. There was no pericardial friction rub.   Pulmonary: equal chest expansion, lungs are clear to auscultation.   Abdomen: " soft, non tender to palpation, not distended  Kidneys: bilateral lower poles not palpable and non tender with percussion.   Extremities: No clubbing of the fingernails and no cyanosis and no edema present.   Skin: warm, pink, well conditioned; no rashes or lesions.     Lab Results   Component Value Date    WBC 6.6 06/20/2024    HGB 15.0 06/20/2024    HCT 43.3 06/20/2024    MCV 85 06/20/2024     06/20/2024       Lab Results   Component Value Date    GLUCOSE 83 11/14/2024    CALCIUM 10.3 11/14/2024     11/14/2024    K 4.0 11/14/2024    CO2 28 11/14/2024     11/14/2024    BUN 17 11/14/2024    CREATININE 0.93 11/14/2024       Lab Results   Component Value Date    PTH 30.7 07/21/2022    CALCIUM 10.3 11/14/2024    PHOS 4.1 11/14/2024       Lab Results   Component Value Date    ALBUMIN 4.6 11/14/2024       Assessment/Plan    CKD stage G2 /A0 (eGFR 72 ml/min, crt 0.93 mg/dl,  UACR <<<; TP/C <9 mg/mg) with history of microscopic hematuria and no proteinuria, RBC <5.  Had normal urologic evaluation. Glomerular causes of isolated microscopic hematuria can be a limited number of conditions including IgA N, thin membrane disease , Alports (specially that she has hearing issues - but in her case is unilateral, so less likely Alports), and MPGN. She has no family hx of Alport's. Prior serology tests unrevealing. Plan to do Veronica genetic testing today. check RFP and quantify proteinuria and UA today ; She would benefit from ACEi/ARB but apparently could not tolerate Losartan. She is in agreement to start SGLT2i to slow CKD progression. She is now interested in pursuing a kidney biopsy which we will consider if genetic testing unrevealing.     BP/Volume: seems euvolemic on exam and BP high - she really like Triamterene/HCTZ so will continue with it ; Addition of Jardiance as above may further lower BP and be kidney protective long term. Lack of proteinuria is a good prognostic indicator. Continue Low salt diet  and regular exercise.      CKD-MBD: normal calcium, phosphorus, PTH, vit D and bicarbonate levels.      No anemia     Start Jardiance 25 mg every day as above    Return to clinic in 3-4 months.    Miladys Benavidez MD [facility]

## 2025-02-07 DIAGNOSIS — I10 ESSENTIAL HYPERTENSION: ICD-10-CM

## 2025-02-07 DIAGNOSIS — N18.2 CHRONIC KIDNEY DISEASE, STAGE 2 (MILD): ICD-10-CM

## 2025-02-07 LAB
ALBUMIN SERPL-MCNC: 4.6 G/DL (ref 3.6–5.1)
BUN SERPL-MCNC: 19 MG/DL (ref 7–25)
BUN/CREAT SERPL: NORMAL (CALC) (ref 6–22)
CALCIUM SERPL-MCNC: 10.2 MG/DL (ref 8.6–10.4)
CHLORIDE SERPL-SCNC: 103 MMOL/L (ref 98–110)
CO2 SERPL-SCNC: 28 MMOL/L (ref 20–32)
CREAT SERPL-MCNC: 0.92 MG/DL (ref 0.5–1.03)
EGFRCR SERPLBLD CKD-EPI 2021: 73 ML/MIN/1.73M2
GLUCOSE SERPL-MCNC: 84 MG/DL (ref 65–99)
PHOSPHATE SERPL-MCNC: 4.2 MG/DL (ref 2.5–4.5)
POTASSIUM SERPL-SCNC: 3.8 MMOL/L (ref 3.5–5.3)
SODIUM SERPL-SCNC: 142 MMOL/L (ref 135–146)

## 2025-02-09 PROBLEM — N32.81 OVERACTIVE BLADDER: Status: RESOLVED | Noted: 2023-10-26 | Resolved: 2025-02-09

## 2025-02-09 PROBLEM — N81.4 UTEROVAGINAL PROLAPSE: Status: RESOLVED | Noted: 2023-10-26 | Resolved: 2025-02-09

## 2025-02-09 PROBLEM — R10.2 PELVIC PAIN IN FEMALE: Status: RESOLVED | Noted: 2023-10-26 | Resolved: 2025-02-09

## 2025-02-09 PROBLEM — N81.9 PROLAPSE OF FEMALE PELVIC ORGANS: Status: RESOLVED | Noted: 2023-10-26 | Resolved: 2025-02-09

## 2025-02-09 PROBLEM — E83.51 HYPOCALCEMIA: Status: RESOLVED | Noted: 2024-05-06 | Resolved: 2025-02-09

## 2025-02-09 PROBLEM — Z00.00 ANNUAL PHYSICAL EXAM: Status: RESOLVED | Noted: 2024-07-22 | Resolved: 2025-02-09

## 2025-02-09 PROBLEM — E86.0 DEHYDRATION: Status: RESOLVED | Noted: 2024-10-08 | Resolved: 2025-02-09

## 2025-02-09 PROBLEM — E79.0 ELEVATED BLOOD URIC ACID LEVEL: Status: RESOLVED | Noted: 2024-11-05 | Resolved: 2025-02-09

## 2025-02-09 PROBLEM — D84.89: Status: RESOLVED | Noted: 2024-05-08 | Resolved: 2025-02-09

## 2025-02-09 PROBLEM — R94.4 RENAL FUNCTION TEST ABNORMAL: Status: RESOLVED | Noted: 2024-10-03 | Resolved: 2025-02-09

## 2025-02-09 PROBLEM — D72.829 LEUKOCYTOSIS: Status: RESOLVED | Noted: 2024-10-03 | Resolved: 2025-02-09

## 2025-02-09 PROBLEM — E87.6: Status: RESOLVED | Noted: 2024-11-05 | Resolved: 2025-02-09

## 2025-02-09 PROBLEM — K57.30 DIVERTICULAR DISEASE OF COLON: Status: RESOLVED | Noted: 2020-01-09 | Resolved: 2025-02-09

## 2025-02-09 PROBLEM — M54.30 SCIATICA: Status: RESOLVED | Noted: 2020-03-19 | Resolved: 2025-02-09

## 2025-02-09 PROBLEM — N95.1 HOT FLASHES DUE TO MENOPAUSE: Status: RESOLVED | Noted: 2024-07-22 | Resolved: 2025-02-09

## 2025-02-09 PROBLEM — I10 ESSENTIAL HYPERTENSION: Status: RESOLVED | Noted: 2023-10-26 | Resolved: 2025-02-09

## 2025-02-09 PROBLEM — E87.6 HYPOKALEMIA: Status: RESOLVED | Noted: 2024-02-07 | Resolved: 2025-02-09

## 2025-02-09 PROBLEM — R79.89 ELEVATED BRAIN NATRIURETIC PEPTIDE (BNP) LEVEL: Status: RESOLVED | Noted: 2024-05-06 | Resolved: 2025-02-09

## 2025-02-09 NOTE — PROGRESS NOTES
Referred by [facility] I'm seeing Gena for  the 1st time since 2/2023. Feeling well.     Past Medical History:  Problem List Items Addressed This Visit    None     Past Medical History:   Diagnosis Date    Hypertension       Past Surgical History:  She has a past surgical history that includes Other surgical history (07/12/2022) and Tubal ligation.      Social History:  She reports that she has been smoking cigarettes. She has been exposed to tobacco smoke. She has never used smokeless tobacco. She reports that she does not currently use alcohol. She reports that she does not use drugs.    Family History:  Family History   Problem Relation Name Age of Onset    Other (small cell lung) Mother       Allergies:  Losartan, Penicillins, Doxycycline, and Nalbuphine    Outpatient Medications:  Current Outpatient Medications   Medication Instructions    albuterol 90 mcg/actuation inhaler 2 puffs, Every 4 hours PRN    azithromycin (ZITHROMAX) 250 mg, Daily    cetirizine (ZyrTEC) 10 mg tablet Take by mouth.    empagliflozin (JARDIANCE) 25 mg, oral, Daily    ergocalciferol (Vitamin D-2) 1.25 MG (77724 UT) capsule 1 capsule, Every 7 days    erythromycin (SHEBA-TAB) 250 mg, Daily    fluticasone (Flonase) 50 mcg/actuation nasal spray 1 spray, Daily    levothyroxine (SYNTHROID, LEVOXYL) 112 mcg, Daily before breakfast    oxyCODONE-acetaminophen (Percocet) 5-325 mg tablet Take by mouth.    Trulicity 1.5 mg, Weekly    turmeric root extract 500 mg capsule 1 capsule, Daily    vitamin E 200 Units, Daily     Last Recorded Vitals:  There were no vitals filed for this visit.    Physical Exam  Patient is alert and oriented x3.  HEENT is unremarkable mucous members are moist  Neck no JVP no bruits upstrokes are full no thyromegaly  Lungs are clear bilaterally.  No wheezing crackles or rales  Heart regular rhythm normal S1-S2 there is no S3 no murmurs are heard.  Abdomen is soft bs are positive nontender nondistended no organomegaly no  "pulsatile masses  Extremities have no edema.  Distal pulses present palpable.  Neuro is grossly nonfocal  Skin has no rashes     Last Labs:  CBC -  Lab Results   Component Value Date    WBC 6.6 06/20/2024    HGB 15.0 06/20/2024    HCT 43.3 06/20/2024    MCV 85 06/20/2024     06/20/2024     CMP -  Lab Results   Component Value Date    CALCIUM 10.2 02/06/2025    PHOS 4.2 02/06/2025    ALBUMIN 4.6 02/06/2025     LIPID PANEL -   No results found for: \"CHOL\", \"HDL\", \"CHHDL\", \"LDL\", \"VLDL\", \"TRIG\", \"NHDL\"    RENAL FUNCTION PANEL -   Lab Results   Component Value Date    K 3.8 02/06/2025    PHOS 4.2 02/06/2025     Lab Results   Component Value Date    HGBA1C 5.6 01/13/2022        Procedures    CAC 11/10/2023 Zero    TST 3/28/2023 nondiagnostic chronotropic incompetence, above average capacity, normal perfusion EF 69%    Echo 3/27/2023 EF 60%    Assessment/Plan   1. Chest pain.  Nuclear stress test 3/20/2023 normal.  Calcium score 11/10/2023 normal.  0.  Likely musculoskeletal and also esophageal in origin.     2. Shortness of breath. Better. Likely related to smoking. Echo 3/27/2023 normal with an EF of 60%.    3. Hyperlipidemia. Not on meds given a CAC of zero.      4. Tob abuse.  >5 min spent discussing. She's interested in accupunture    RTC prn       Brady Felder MD     Instructions and follow up    "

## 2025-02-10 ENCOUNTER — APPOINTMENT (OUTPATIENT)
Dept: CARDIOLOGY | Facility: CLINIC | Age: 55
End: 2025-02-10
Payer: MEDICAID

## 2025-02-10 VITALS
SYSTOLIC BLOOD PRESSURE: 132 MMHG | HEART RATE: 77 BPM | OXYGEN SATURATION: 99 % | DIASTOLIC BLOOD PRESSURE: 84 MMHG | WEIGHT: 153 LBS | BODY MASS INDEX: 26.26 KG/M2

## 2025-02-10 DIAGNOSIS — F17.219 CIGARETTE NICOTINE DEPENDENCE WITH NICOTINE-INDUCED DISORDER: ICD-10-CM

## 2025-02-10 DIAGNOSIS — R06.02 SOB (SHORTNESS OF BREATH) ON EXERTION: ICD-10-CM

## 2025-02-10 DIAGNOSIS — R60.0 LEG EDEMA: ICD-10-CM

## 2025-02-10 DIAGNOSIS — I10 PRIMARY HYPERTENSION: ICD-10-CM

## 2025-02-10 DIAGNOSIS — R07.89 OTHER CHEST PAIN: Primary | ICD-10-CM

## 2025-02-10 DIAGNOSIS — E78.00 HYPERCHOLESTEROLEMIA: ICD-10-CM

## 2025-02-10 PROCEDURE — 99406 BEHAV CHNG SMOKING 3-10 MIN: CPT | Performed by: INTERNAL MEDICINE

## 2025-02-10 PROCEDURE — 3079F DIAST BP 80-89 MM HG: CPT | Performed by: INTERNAL MEDICINE

## 2025-02-10 PROCEDURE — 93005 ELECTROCARDIOGRAM TRACING: CPT | Performed by: INTERNAL MEDICINE

## 2025-02-10 PROCEDURE — 99213 OFFICE O/P EST LOW 20 MIN: CPT | Mod: 25 | Performed by: INTERNAL MEDICINE

## 2025-02-10 PROCEDURE — 93010 ELECTROCARDIOGRAM REPORT: CPT | Performed by: INTERNAL MEDICINE

## 2025-02-10 PROCEDURE — 3075F SYST BP GE 130 - 139MM HG: CPT | Performed by: INTERNAL MEDICINE

## 2025-02-10 PROCEDURE — 99213 OFFICE O/P EST LOW 20 MIN: CPT | Performed by: INTERNAL MEDICINE

## 2025-02-10 NOTE — PATIENT INSTRUCTIONS
1. Chest pain.  Nuclear stress test 3/20/2023 normal.  Calcium score 11/10/2023 normal.  0.  Likely musculoskeletal and also esophageal in origin.     2. Shortness of breath. Better. Likely related to smoking. Echo 3/27/2023 normal with an EF of 60%.    3. Hyperlipidemia. Not on meds given a CAC of zero.      4. Tob abuse.  >5 min spent discussing. She's interested in accupunture    RTC prn

## 2025-02-16 LAB
ATRIAL RATE: 73 BPM
P AXIS: 35 DEGREES
P OFFSET: 191 MS
P ONSET: 141 MS
PR INTERVAL: 174 MS
Q ONSET: 228 MS
QRS COUNT: 12 BEATS
QRS DURATION: 86 MS
QT INTERVAL: 360 MS
QTC CALCULATION(BAZETT): 396 MS
QTC FREDERICIA: 384 MS
R AXIS: -27 DEGREES
T AXIS: 26 DEGREES
T OFFSET: 408 MS
VENTRICULAR RATE: 73 BPM

## 2025-02-26 ENCOUNTER — APPOINTMENT (OUTPATIENT)
Dept: GASTROENTEROLOGY | Facility: CLINIC | Age: 55
End: 2025-02-26
Payer: MEDICAID

## 2025-02-26 ENCOUNTER — TELEPHONE (OUTPATIENT)
Dept: GASTROENTEROLOGY | Facility: CLINIC | Age: 55
End: 2025-02-26

## 2025-03-01 ASSESSMENT — ENCOUNTER SYMPTOMS
RESPIRATORY NEGATIVE: 1
CARDIOVASCULAR NEGATIVE: 1
CONSTITUTIONAL NEGATIVE: 1
MUSCULOSKELETAL NEGATIVE: 1

## 2025-03-01 NOTE — PROGRESS NOTES
Subjective     History of Present Illness:   Gena Baltazar is a 55 y.o. female who presents to GI clinic for follow-up of esophageal manometry. She had originally had an EGD by me open access and that was completely normal with normal esophagus.  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 I last saw her on 10/30/2024 at which time her primary symptoms included non cardiac chest pain. I had given her a trial of sildenafil 50 mg twice daily given she had severe symptoms and chest pain as opposed to dysphagia.     Today, she       Review of Systems  Review of Systems   Constitutional: Negative.    Respiratory: Negative.     Cardiovascular: Negative.    Musculoskeletal: Negative.    Skin: Negative.        Past Medical History   has a past medical history of Hypercholesterolemia (10/26/2023) and Hypertension.     Social History   reports that she has been smoking cigarettes. She has been exposed to tobacco smoke. She has never used smokeless tobacco. She reports that she does not currently use alcohol. She reports that she does not use drugs.     Family History  family history includes small cell lung in her mother.     Allergies  Allergies   Allergen Reactions    Losartan Unknown     Back pain 10/10    Penicillins Unknown    Doxycycline Rash    Nalbuphine Drowsiness and Other     Other reaction(s): patient states she slept 2days       Medications  Current Outpatient Medications   Medication Instructions    albuterol 90 mcg/actuation inhaler 2 puffs, Every 4 hours PRN    azithromycin (ZITHROMAX) 250 mg, Daily    cetirizine (ZyrTEC) 10 mg tablet Take by mouth.    empagliflozin (JARDIANCE) 25 mg, oral, Daily    ergocalciferol (Vitamin D-2) 1.25 MG (42345 UT) capsule 1 capsule, Every 7 days    fluticasone (Flonase) 50 mcg/actuation nasal spray 1 spray, Daily    levothyroxine (SYNTHROID, LEVOXYL) 112 mcg, Daily before  breakfast    oxyCODONE-acetaminophen (Percocet) 5-325 mg tablet Take by mouth.    Trulicity 1.5 mg, Weekly    turmeric root extract 500 mg capsule 1 capsule, Daily    vitamin E 200 Units, Daily       Objective   There were no vitals taken for this visit.  Physical Exam  Vitals reviewed.   Constitutional:       Appearance: Normal appearance.   Cardiovascular:      Rate and Rhythm: Normal rate and regular rhythm.      Pulses: Normal pulses.   Pulmonary:      Effort: Pulmonary effort is normal.      Breath sounds: Normal breath sounds.   Abdominal:      General: Abdomen is flat. Bowel sounds are normal. There is no distension.      Palpations: Abdomen is soft.      Tenderness: There is no abdominal tenderness.   Musculoskeletal:         General: Normal range of motion.   Neurological:      Mental Status: She is alert.         Assessment/Plan   Gena Baltazar is a 55 y.o. female who presents to GI clinic for follow-up.        By signing my name below, KENNEDIDelmy Scribvineet attest that this documentation has been prepared under the direction and in the presence of Andi Coley MD

## 2025-03-04 ENCOUNTER — APPOINTMENT (OUTPATIENT)
Dept: GASTROENTEROLOGY | Facility: CLINIC | Age: 55
End: 2025-03-04
Payer: MEDICAID

## 2025-03-04 VITALS
HEIGHT: 64 IN | WEIGHT: 150.6 LBS | BODY MASS INDEX: 25.71 KG/M2 | DIASTOLIC BLOOD PRESSURE: 91 MMHG | SYSTOLIC BLOOD PRESSURE: 133 MMHG

## 2025-03-04 DIAGNOSIS — K31.89 ESOPHAGOGASTRIC JUNCTION OUTFLOW OBSTRUCTION: Primary | ICD-10-CM

## 2025-03-04 DIAGNOSIS — K22.4 HYPERCONTRACTILE ESOPHAGUS: ICD-10-CM

## 2025-03-04 DIAGNOSIS — K22.89 ESOPHAGOGASTRIC JUNCTION OUTFLOW OBSTRUCTION: Primary | ICD-10-CM

## 2025-03-04 PROCEDURE — 3075F SYST BP GE 130 - 139MM HG: CPT | Performed by: INTERNAL MEDICINE

## 2025-03-04 PROCEDURE — 3008F BODY MASS INDEX DOCD: CPT | Performed by: INTERNAL MEDICINE

## 2025-03-04 PROCEDURE — 99214 OFFICE O/P EST MOD 30 MIN: CPT | Performed by: INTERNAL MEDICINE

## 2025-03-04 PROCEDURE — 3080F DIAST BP >= 90 MM HG: CPT | Performed by: INTERNAL MEDICINE

## 2025-04-15 ENCOUNTER — APPOINTMENT (OUTPATIENT)
Dept: UROLOGY | Facility: CLINIC | Age: 55
End: 2025-04-15
Payer: MEDICAID

## 2025-05-08 ENCOUNTER — APPOINTMENT (OUTPATIENT)
Dept: NEPHROLOGY | Facility: CLINIC | Age: 55
End: 2025-05-08
Payer: MEDICAID

## 2025-05-22 ENCOUNTER — APPOINTMENT (OUTPATIENT)
Dept: UROLOGY | Facility: CLINIC | Age: 55
End: 2025-05-22
Payer: MEDICAID

## 2025-06-19 ENCOUNTER — APPOINTMENT (OUTPATIENT)
Dept: NEPHROLOGY | Facility: CLINIC | Age: 55
End: 2025-06-19
Payer: MEDICAID

## 2025-07-08 ENCOUNTER — APPOINTMENT (OUTPATIENT)
Dept: UROLOGY | Facility: CLINIC | Age: 55
End: 2025-07-08
Payer: MEDICAID

## 2025-07-24 ENCOUNTER — APPOINTMENT (OUTPATIENT)
Dept: OBSTETRICS AND GYNECOLOGY | Facility: CLINIC | Age: 55
End: 2025-07-24
Payer: MEDICAID